# Patient Record
Sex: FEMALE | Race: WHITE | NOT HISPANIC OR LATINO | URBAN - METROPOLITAN AREA
[De-identification: names, ages, dates, MRNs, and addresses within clinical notes are randomized per-mention and may not be internally consistent; named-entity substitution may affect disease eponyms.]

---

## 2017-06-27 RX ORDER — INSULIN DETEMIR 100/ML (3)
0 INSULIN PEN (ML) SUBCUTANEOUS
Qty: 30 | Refills: 0 | COMMUNITY
Start: 2017-06-27

## 2017-07-09 RX ORDER — GLIPIZIDE/METFORMIN HCL 2.5-500 MG
0 TABLET ORAL
Qty: 60 | Refills: 0 | COMMUNITY
Start: 2017-07-09

## 2017-07-24 RX ORDER — OLMESARTAN MEDOXOMIL / AMLODIPINE BESYLATE / HYDROCHLOROTHIAZIDE 40; 10; 25 MG/1; MG/1; MG/1
0 TABLET, FILM COATED ORAL
Qty: 30 | Refills: 0 | COMMUNITY
Start: 2017-07-24

## 2017-08-01 ENCOUNTER — INPATIENT (INPATIENT)
Facility: HOSPITAL | Age: 82
LOS: 2 days | Discharge: HOME HEALTH | DRG: 602 | End: 2017-08-04
Attending: HOSPITALIST | Admitting: HOSPITALIST
Payer: MEDICARE

## 2017-08-01 VITALS
TEMPERATURE: 99 F | WEIGHT: 229.94 LBS | OXYGEN SATURATION: 96 % | HEART RATE: 67 BPM | RESPIRATION RATE: 18 BRPM | HEIGHT: 62.99 IN | DIASTOLIC BLOOD PRESSURE: 55 MMHG | SYSTOLIC BLOOD PRESSURE: 124 MMHG

## 2017-08-01 DIAGNOSIS — L03.90 CELLULITIS, UNSPECIFIED: ICD-10-CM

## 2017-08-01 LAB
ALBUMIN SERPL ELPH-MCNC: 2.9 G/DL — LOW (ref 3.5–5)
ALP SERPL-CCNC: 48 U/L — SIGNIFICANT CHANGE UP (ref 40–120)
ALT FLD-CCNC: 22 U/L DA — SIGNIFICANT CHANGE UP (ref 10–60)
ANION GAP SERPL CALC-SCNC: 3 MMOL/L — LOW (ref 5–17)
AST SERPL-CCNC: 24 U/L — SIGNIFICANT CHANGE UP (ref 10–40)
BASOPHILS # BLD AUTO: 0.1 K/UL — SIGNIFICANT CHANGE UP (ref 0–0.2)
BASOPHILS NFR BLD AUTO: 0.7 % — SIGNIFICANT CHANGE UP (ref 0–2)
BILIRUB SERPL-MCNC: 0.3 MG/DL — SIGNIFICANT CHANGE UP (ref 0.2–1.2)
BUN SERPL-MCNC: 42 MG/DL — HIGH (ref 7–18)
CALCIUM SERPL-MCNC: 8.5 MG/DL — SIGNIFICANT CHANGE UP (ref 8.4–10.5)
CHLORIDE SERPL-SCNC: 104 MMOL/L — SIGNIFICANT CHANGE UP (ref 96–108)
CO2 SERPL-SCNC: 30 MMOL/L — SIGNIFICANT CHANGE UP (ref 22–31)
CREAT SERPL-MCNC: 1.74 MG/DL — HIGH (ref 0.5–1.3)
EOSINOPHIL # BLD AUTO: 0.1 K/UL — SIGNIFICANT CHANGE UP (ref 0–0.5)
EOSINOPHIL NFR BLD AUTO: 1.2 % — SIGNIFICANT CHANGE UP (ref 0–6)
GLUCOSE SERPL-MCNC: 224 MG/DL — HIGH (ref 70–99)
HCT VFR BLD CALC: 31.5 % — LOW (ref 34.5–45)
HGB BLD-MCNC: 10.3 G/DL — LOW (ref 11.5–15.5)
LACTATE SERPL-SCNC: 1.4 MMOL/L — SIGNIFICANT CHANGE UP (ref 0.7–2)
LYMPHOCYTES # BLD AUTO: 1.9 K/UL — SIGNIFICANT CHANGE UP (ref 1–3.3)
LYMPHOCYTES # BLD AUTO: 25.1 % — SIGNIFICANT CHANGE UP (ref 13–44)
MCHC RBC-ENTMCNC: 32.7 GM/DL — SIGNIFICANT CHANGE UP (ref 32–36)
MCHC RBC-ENTMCNC: 33.3 PG — SIGNIFICANT CHANGE UP (ref 27–34)
MCV RBC AUTO: 101.8 FL — HIGH (ref 80–100)
MONOCYTES # BLD AUTO: 0.7 K/UL — SIGNIFICANT CHANGE UP (ref 0–0.9)
MONOCYTES NFR BLD AUTO: 9.5 % — SIGNIFICANT CHANGE UP (ref 2–14)
NEUTROPHILS # BLD AUTO: 4.9 K/UL — SIGNIFICANT CHANGE UP (ref 1.8–7.4)
NEUTROPHILS NFR BLD AUTO: 63.5 % — SIGNIFICANT CHANGE UP (ref 43–77)
PLATELET # BLD AUTO: 197 K/UL — SIGNIFICANT CHANGE UP (ref 150–400)
POTASSIUM SERPL-MCNC: 5.3 MMOL/L — SIGNIFICANT CHANGE UP (ref 3.5–5.3)
POTASSIUM SERPL-SCNC: 5.3 MMOL/L — SIGNIFICANT CHANGE UP (ref 3.5–5.3)
PROT SERPL-MCNC: 7.5 G/DL — SIGNIFICANT CHANGE UP (ref 6–8.3)
RBC # BLD: 3.1 M/UL — LOW (ref 3.8–5.2)
RBC # FLD: 12.2 % — SIGNIFICANT CHANGE UP (ref 10.3–14.5)
SODIUM SERPL-SCNC: 137 MMOL/L — SIGNIFICANT CHANGE UP (ref 135–145)
WBC # BLD: 7.7 K/UL — SIGNIFICANT CHANGE UP (ref 3.8–10.5)
WBC # FLD AUTO: 7.7 K/UL — SIGNIFICANT CHANGE UP (ref 3.8–10.5)

## 2017-08-01 PROCEDURE — 71010: CPT | Mod: 26

## 2017-08-01 PROCEDURE — 73590 X-RAY EXAM OF LOWER LEG: CPT | Mod: 26,RT

## 2017-08-01 PROCEDURE — 99285 EMERGENCY DEPT VISIT HI MDM: CPT

## 2017-08-01 RX ORDER — METOPROLOL TARTRATE 50 MG
50 TABLET ORAL
Qty: 0 | Refills: 0 | Status: DISCONTINUED | OUTPATIENT
Start: 2017-08-01 | End: 2017-08-04

## 2017-08-01 RX ORDER — DEXTROSE 50 % IN WATER 50 %
25 SYRINGE (ML) INTRAVENOUS ONCE
Qty: 0 | Refills: 0 | Status: DISCONTINUED | OUTPATIENT
Start: 2017-08-01 | End: 2017-08-02

## 2017-08-01 RX ORDER — SENNA PLUS 8.6 MG/1
2 TABLET ORAL AT BEDTIME
Qty: 0 | Refills: 0 | Status: DISCONTINUED | OUTPATIENT
Start: 2017-08-01 | End: 2017-08-04

## 2017-08-01 RX ORDER — CEFEPIME 1 G/1
1000 INJECTION, POWDER, FOR SOLUTION INTRAMUSCULAR; INTRAVENOUS ONCE
Qty: 0 | Refills: 0 | Status: COMPLETED | OUTPATIENT
Start: 2017-08-01 | End: 2017-08-01

## 2017-08-01 RX ORDER — HYDRALAZINE HCL 50 MG
50 TABLET ORAL EVERY 8 HOURS
Qty: 0 | Refills: 0 | Status: DISCONTINUED | OUTPATIENT
Start: 2017-08-01 | End: 2017-08-01

## 2017-08-01 RX ORDER — CLOPIDOGREL BISULFATE 75 MG/1
75 TABLET, FILM COATED ORAL DAILY
Qty: 0 | Refills: 0 | Status: DISCONTINUED | OUTPATIENT
Start: 2017-08-01 | End: 2017-08-04

## 2017-08-01 RX ORDER — GLUCAGON INJECTION, SOLUTION 0.5 MG/.1ML
1 INJECTION, SOLUTION SUBCUTANEOUS ONCE
Qty: 0 | Refills: 0 | Status: DISCONTINUED | OUTPATIENT
Start: 2017-08-01 | End: 2017-08-02

## 2017-08-01 RX ORDER — METOPROLOL TARTRATE 50 MG
25 TABLET ORAL
Qty: 0 | Refills: 0 | Status: DISCONTINUED | OUTPATIENT
Start: 2017-08-01 | End: 2017-08-01

## 2017-08-01 RX ORDER — RANOLAZINE 500 MG/1
1000 TABLET, FILM COATED, EXTENDED RELEASE ORAL
Qty: 0 | Refills: 0 | Status: DISCONTINUED | OUTPATIENT
Start: 2017-08-01 | End: 2017-08-04

## 2017-08-01 RX ORDER — FUROSEMIDE 40 MG
40 TABLET ORAL DAILY
Qty: 0 | Refills: 0 | Status: DISCONTINUED | OUTPATIENT
Start: 2017-08-01 | End: 2017-08-02

## 2017-08-01 RX ORDER — SODIUM CHLORIDE 9 MG/ML
1000 INJECTION INTRAMUSCULAR; INTRAVENOUS; SUBCUTANEOUS
Qty: 0 | Refills: 0 | Status: DISCONTINUED | OUTPATIENT
Start: 2017-08-01 | End: 2017-08-01

## 2017-08-01 RX ORDER — SODIUM CHLORIDE 9 MG/ML
1000 INJECTION INTRAMUSCULAR; INTRAVENOUS; SUBCUTANEOUS
Qty: 0 | Refills: 0 | Status: DISCONTINUED | OUTPATIENT
Start: 2017-08-01 | End: 2017-08-04

## 2017-08-01 RX ORDER — FERROUS SULFATE 325(65) MG
325 TABLET ORAL
Qty: 0 | Refills: 0 | Status: DISCONTINUED | OUTPATIENT
Start: 2017-08-01 | End: 2017-08-04

## 2017-08-01 RX ORDER — ATORVASTATIN CALCIUM 80 MG/1
20 TABLET, FILM COATED ORAL AT BEDTIME
Qty: 0 | Refills: 0 | Status: DISCONTINUED | OUTPATIENT
Start: 2017-08-01 | End: 2017-08-04

## 2017-08-01 RX ORDER — PANTOPRAZOLE SODIUM 20 MG/1
40 TABLET, DELAYED RELEASE ORAL
Qty: 0 | Refills: 0 | Status: DISCONTINUED | OUTPATIENT
Start: 2017-08-01 | End: 2017-08-04

## 2017-08-01 RX ORDER — SODIUM CHLORIDE 9 MG/ML
1000 INJECTION, SOLUTION INTRAVENOUS
Qty: 0 | Refills: 0 | Status: DISCONTINUED | OUTPATIENT
Start: 2017-08-01 | End: 2017-08-02

## 2017-08-01 RX ORDER — ENOXAPARIN SODIUM 100 MG/ML
40 INJECTION SUBCUTANEOUS DAILY
Qty: 0 | Refills: 0 | Status: DISCONTINUED | OUTPATIENT
Start: 2017-08-01 | End: 2017-08-02

## 2017-08-01 RX ORDER — INSULIN GLARGINE 100 [IU]/ML
25 INJECTION, SOLUTION SUBCUTANEOUS EVERY MORNING
Qty: 0 | Refills: 0 | Status: DISCONTINUED | OUTPATIENT
Start: 2017-08-01 | End: 2017-08-04

## 2017-08-01 RX ORDER — DEXTROSE 50 % IN WATER 50 %
1 SYRINGE (ML) INTRAVENOUS ONCE
Qty: 0 | Refills: 0 | Status: DISCONTINUED | OUTPATIENT
Start: 2017-08-01 | End: 2017-08-02

## 2017-08-01 RX ORDER — ZOLPIDEM TARTRATE 10 MG/1
5 TABLET ORAL AT BEDTIME
Qty: 0 | Refills: 0 | Status: DISCONTINUED | OUTPATIENT
Start: 2017-08-01 | End: 2017-08-04

## 2017-08-01 RX ORDER — ATORVASTATIN CALCIUM 80 MG/1
40 TABLET, FILM COATED ORAL AT BEDTIME
Qty: 0 | Refills: 0 | Status: DISCONTINUED | OUTPATIENT
Start: 2017-08-01 | End: 2017-08-01

## 2017-08-01 RX ORDER — TRAZODONE HCL 50 MG
100 TABLET ORAL DAILY
Qty: 0 | Refills: 0 | Status: DISCONTINUED | OUTPATIENT
Start: 2017-08-01 | End: 2017-08-04

## 2017-08-01 RX ORDER — DOCUSATE SODIUM 100 MG
100 CAPSULE ORAL
Qty: 0 | Refills: 0 | Status: DISCONTINUED | OUTPATIENT
Start: 2017-08-01 | End: 2017-08-04

## 2017-08-01 RX ORDER — POLYETHYLENE GLYCOL 3350 17 G/17G
17 POWDER, FOR SOLUTION ORAL ONCE
Qty: 0 | Refills: 0 | Status: COMPLETED | OUTPATIENT
Start: 2017-08-01 | End: 2017-08-01

## 2017-08-01 RX ORDER — INSULIN LISPRO 100/ML
VIAL (ML) SUBCUTANEOUS
Qty: 0 | Refills: 0 | Status: DISCONTINUED | OUTPATIENT
Start: 2017-08-01 | End: 2017-08-04

## 2017-08-01 RX ORDER — ACETAMINOPHEN 500 MG
650 TABLET ORAL EVERY 6 HOURS
Qty: 0 | Refills: 0 | Status: DISCONTINUED | OUTPATIENT
Start: 2017-08-01 | End: 2017-08-04

## 2017-08-01 RX ORDER — INSULIN GLARGINE 100 [IU]/ML
35 INJECTION, SOLUTION SUBCUTANEOUS AT BEDTIME
Qty: 0 | Refills: 0 | Status: DISCONTINUED | OUTPATIENT
Start: 2017-08-01 | End: 2017-08-04

## 2017-08-01 RX ORDER — DEXTROSE 50 % IN WATER 50 %
12.5 SYRINGE (ML) INTRAVENOUS ONCE
Qty: 0 | Refills: 0 | Status: DISCONTINUED | OUTPATIENT
Start: 2017-08-01 | End: 2017-08-02

## 2017-08-01 RX ORDER — FOLIC ACID 0.8 MG
1 TABLET ORAL DAILY
Qty: 0 | Refills: 0 | Status: DISCONTINUED | OUTPATIENT
Start: 2017-08-01 | End: 2017-08-04

## 2017-08-01 RX ADMIN — Medication 100 MILLIGRAM(S): at 23:58

## 2017-08-01 RX ADMIN — CEFEPIME 100 MILLIGRAM(S): 1 INJECTION, POWDER, FOR SOLUTION INTRAMUSCULAR; INTRAVENOUS at 12:49

## 2017-08-01 RX ADMIN — POLYETHYLENE GLYCOL 3350 17 GRAM(S): 17 POWDER, FOR SOLUTION ORAL at 23:47

## 2017-08-01 RX ADMIN — ZOLPIDEM TARTRATE 5 MILLIGRAM(S): 10 TABLET ORAL at 23:47

## 2017-08-01 NOTE — ED PROVIDER NOTE - PROGRESS NOTE DETAILS
Podiatry in house made aware carlos  pt awake alert oriented with use of  phone pt has discussed risk and benefits of AMA including worsening infection, gangrene, loss of limb and death.  she can verbalize and understanding of this and declines admission.  levaquin sent to pharmecy as next best treatment.  pt encouraged to return for any new or concerning symtptoms or wants to be admitted carlos pt now decided to stay

## 2017-08-01 NOTE — ED PROVIDER NOTE - PMH
Diabetes    Fall    Fx  right shoulder in 2009  Heart failure, chronic, diastolic    HLD (hyperlipidemia)    HTN (hypertension)    Osteoarthritis    Vertigo

## 2017-08-01 NOTE — ED ADULT NURSE NOTE - OBJECTIVE STATEMENT
Presented to ED complaining of right leg ulcer. AA&Ox3. Breathing on room air. With cast to right leg. Removed by MD.

## 2017-08-01 NOTE — ED PROVIDER NOTE - NS ED ROS FT
REVIEW OF SYSTEMS:  CONSTITUTIONAL: No fever, weight loss, or fatigue  EYES: No eye pain, visual disturbances, or discharge  ENMT:  No difficulty hearing, tinnitus, vertigo; No sinus or throat pain  NECK: No pain or stiffness  BREASTS: No pain, masses, or nipple discharge  RESPIRATORY: No cough, wheezing, chills or hemoptysis; No shortness of breath  CARDIOVASCULAR: No chest pain, palpitations, dizziness, bilateral leg swelling present   GASTROINTESTINAL: No abdominal or epigastric pain. No nausea, vomiting, or hematemesis; No diarrhea or constipation. No melena or hematochezia.  GENITOURINARY: No dysuria, frequency, hematuria, or incontinence  NEUROLOGICAL: No headaches, memory loss, loss of strength, numbness, or tremors  SKIN: LLE redness   LYMPH NODES: No enlarged glands  ENDOCRINE: No heat or cold intolerance; No hair loss

## 2017-08-01 NOTE — CONSULT NOTE ADULT - SUBJECTIVE AND OBJECTIVE BOX
S : 81y year old Female seen at bedside for Right foot ulceration and right leg cellulitis.  Patient relates to having the ulceration for ---, patient is being followed by Dr. Jensen, his Podiatrist. Patient was at Dr. Jensen's office, when the podiatrist was concerned for possible cellulites infection and recommended him to come to the ED for further evaluation.   Chief Complaint : Patient is a 81y old  Female who presents with a chief complaint of   HPI : HPI:      Patient admits to  (-) Fevers, (-) Chills, (-) Nausea, (-) Vomiting, (-) Shortness of Breath      PMH: Heart failure, chronic, diastolic  Osteoarthritis  Fx  Fall  HLD (hyperlipidemia)  Vertigo  HTN (hypertension)  Diabetes  Congestive heart failure    PSH:S/P angioplasty with stent  History of cholecystectomy  No significant past surgical history      Allergies:No Known Allergies      Labs:      WBC Trend      Chem              T(F): 99.2 (08-01-17 @ 10:45), Max: 99.2 (08-01-17 @ 10:45)  HR: 67 (08-01-17 @ 10:45) (67 - 67)  BP: 124/55 (08-01-17 @ 10:45) (124/55 - 124/55)  RR: 18 (08-01-17 @ 10:45) (18 - 18)  SpO2: 96% (08-01-17 @ 10:45) (96% - 96%)  Wt(kg): --    O:   General: Pleasant  female NAD & AOX3.    Integument:  Skin warm, dry and supple bilateral.    Ulceration Right leg red streaking extending from forefoot to leg, erythematous, warm to touch   Right foot ulceration:- in nature, +/- hyperkeratotic border, wound base Granular/Fibrogranular/Necrotic patches/ , wound size (-- cm X – cm X –cm) +/- edema, +/- yazmin-wound erythema, +/- purulence, +/- fluctuance, +/- tracking/tunneling, +/- probe to bone.   Vascular: Dorsalis Pedis and Posterior Tibial pulses --/4.  Capillary re-fill time less then 3 seconds digits 1-5 bilateral.    Neuro: Protective sensation intact/diminished to the level of the digits bilateral.  MSK: Muscle strength 5/5 all major muscle groups bilateral.  Deformity:  A: Right/Left foot ulceration      P:   Chart reviewed and Patient evaluated  Discussed diagnosis and treatment with patient  Wound flush with normal saline  Obtained wound culture to be sent to Pathology  Applied right foot ulcer with dry sterile dressing  X-rays reviewed : No soft tissue emphysema present   Continue with IV antibiotics As Per ID  Ordered CADEN, vascular consult recommended   NWB to right leg  Physical Therapy consult recommended.   Discussed importance of daily foot examinations and proper shoe gear and to importance of lower Fasting Blood Glucose levels.   Podiatry will follow while in house.  Discussed with Dr. Montelongo S : 81y year old Female seen at bedside for Right leg ulceration and right leg cellulitis.  Patient relates to having the ulceration for 1 month, patient is being followed by Dr. Jensen, her Podiatrist. Patient was at Dr. Jensen's office, when the podiatrist was concerned for possible cellulites infection and recommended her to come to the ED for further evaluation. Mikey Palmer: [phone]. Spoke to Dr. Jensen over the phone, he has been seeing the patient for 1 week now, he applied Unna Boot to the patients leg to decrease the swelling, also ordered cultures of the ulcer site which came back positive for Pseudomonas infection and wants patients to be treated via IV antibiotics. Patient is accompanied by home aid and speaks only Egyptian.      Patient admits to  (+) Fevers, (-) Chills, (-) Nausea, (-) Vomiting, (-) Shortness of Breath      PMH: Heart failure, chronic, diastolic  Osteoarthritis  Fx  Fall  HLD (hyperlipidemia)  Vertigo  HTN (hypertension)  Diabetes  Congestive heart failure    PSH:S/P angioplasty with stent  History of cholecystectomy  No significant past surgical history      Allergies:No Known Allergies      Labs:      WBC Trend      Chem              T(F): 99.2 (08-01-17 @ 10:45), Max: 99.2 (08-01-17 @ 10:45)  HR: 67 (08-01-17 @ 10:45) (67 - 67)  BP: 124/55 (08-01-17 @ 10:45) (124/55 - 124/55)  RR: 18 (08-01-17 @ 10:45) (18 - 18)  SpO2: 96% (08-01-17 @ 10:45) (96% - 96%)  Wt(kg): --    O:   General: Pleasant  female NAD & AOX3.    Integument:  Skin warm, dry and supple bilateral.    Ulceration Right leg red streaking extending from forefoot to leg, erythematous, warm to touch   Right lateral leg ulceration:-  fibrotic in nature with green patches, - hyperkeratotic border, +  edema, +  yazmin-wound erythema, - purulence, -  fluctuance, - tracking/tunneling, - probe to bone.   Vascular: Dorsalis Pedis 1/4 and Posterior Tibial pulses non palpable.  Capillary re-fill time less then 3 seconds digits 1-5 bilateral.    Neuro: Protective sensation diminished to the level of the digits bilateral.  MSK: Muscle strength 3/5 all major muscle groups bilateral. Pain upon palpation of right leg   Deformity:  A: Right leg ulcer and cellultis     P:   Chart reviewed and Patient evaluated  Discussed diagnosis and treatment with patient  Wound flush with normal saline  Obtained wound culture to be sent to Pathology  Applied right foot ulcer with dry sterile dressing  X-rays reviewed : No soft tissue emphysema present   Continue with IV antibiotics As Per ID  Ordered CADEN, vascular consult recommended   NWB to right leg  Physical Therapy consult recommended.   Discussed importance of daily foot examinations and proper shoe gear and to importance of lower Fasting Blood Glucose levels.   Podiatry will follow while in house.  Discussed with Dr. Montelongo

## 2017-08-01 NOTE — ED PROVIDER NOTE - PHYSICAL EXAMINATION
GENERAL: NAD, well-groomed, well-developed  HEAD:  Atraumatic, Normocephalic  EYES: EOMI, PERRLA, conjunctiva and sclera clear  ENMT: No tonsillar erythema, exudates, or enlargement; Moist mucous membranes, Good dentition, No lesions  NECK: Supple, No JVD, Normal thyroid  NERVOUS SYSTEM:  Alert & Oriented X3,   CHEST/LUNG: Clear to percussion bilaterally; No rales, rhonchi, wheezing, or rubs  HEART: Regular rate and rhythm; No murmurs, rubs, or gallops  ABDOMEN: Soft, Nontender, Nondistended; Bowel sounds present  EXTREMITIES:  right leg in bandage. No clubbing, cyanosis, or edema  LYMPH: No lymphadenopathy noted

## 2017-08-01 NOTE — ED PROVIDER NOTE - OBJECTIVE STATEMENT
81 year old female from home 81 year old female from home past medical history of diabetes, diabetic neuropathy, hypertension, hyperlipidemia, depression, heart failure, constipation and recently diagnosed with klebsiella and pseudomonas bacteremia was sent by podiatry Dr Jensen for evaluation of worsening of right leg redness and swelling. As per patient, the podiatrist is concerned she might have cellulitis and requires iv antibiotics. Denies recent cough, fever, shortness of breath, abdominal pain, diarrhea, dysuria or other complaints at this time. 81 year old female from home past medical history of diabetes, diabetic neuropathy, hypertension, hyperlipidemia, depression, heart failure, constipation and recently diagnosed with klebsiella and pseudomonas bacteremia was sent by podiatry Dr Jensen for evaluation of worsening of right leg redness and swelling. As per patient, the podiatrist is concerned she might have cellulitis and requires iv antibiotics. Denies recent cough, fever, shortness of breath, abdominal pain, diarrhea, dysuria or other complaints at this time.  carlos 81 year old with complaitn of being setn to ed for iv antibx

## 2017-08-02 DIAGNOSIS — I50.9 HEART FAILURE, UNSPECIFIED: ICD-10-CM

## 2017-08-02 DIAGNOSIS — L03.90 CELLULITIS, UNSPECIFIED: ICD-10-CM

## 2017-08-02 DIAGNOSIS — F32.9 MAJOR DEPRESSIVE DISORDER, SINGLE EPISODE, UNSPECIFIED: ICD-10-CM

## 2017-08-02 DIAGNOSIS — I10 ESSENTIAL (PRIMARY) HYPERTENSION: ICD-10-CM

## 2017-08-02 DIAGNOSIS — Z29.9 ENCOUNTER FOR PROPHYLACTIC MEASURES, UNSPECIFIED: ICD-10-CM

## 2017-08-02 DIAGNOSIS — N17.9 ACUTE KIDNEY FAILURE, UNSPECIFIED: ICD-10-CM

## 2017-08-02 DIAGNOSIS — E11.9 TYPE 2 DIABETES MELLITUS WITHOUT COMPLICATIONS: ICD-10-CM

## 2017-08-02 LAB
24R-OH-CALCIDIOL SERPL-MCNC: 32.7 NG/ML — SIGNIFICANT CHANGE UP (ref 30–100)
ALBUMIN SERPL ELPH-MCNC: 2.9 G/DL — LOW (ref 3.5–5)
ALP SERPL-CCNC: 52 U/L — SIGNIFICANT CHANGE UP (ref 40–120)
ALT FLD-CCNC: 23 U/L DA — SIGNIFICANT CHANGE UP (ref 10–60)
ANION GAP SERPL CALC-SCNC: 3 MMOL/L — LOW (ref 5–17)
APPEARANCE UR: CLEAR — SIGNIFICANT CHANGE UP
AST SERPL-CCNC: 22 U/L — SIGNIFICANT CHANGE UP (ref 10–40)
BASOPHILS # BLD AUTO: 0.1 K/UL — SIGNIFICANT CHANGE UP (ref 0–0.2)
BASOPHILS NFR BLD AUTO: 0.7 % — SIGNIFICANT CHANGE UP (ref 0–2)
BILIRUB SERPL-MCNC: 0.3 MG/DL — SIGNIFICANT CHANGE UP (ref 0.2–1.2)
BILIRUB UR-MCNC: NEGATIVE — SIGNIFICANT CHANGE UP
BUN SERPL-MCNC: 34 MG/DL — HIGH (ref 7–18)
CALCIUM SERPL-MCNC: 8.9 MG/DL — SIGNIFICANT CHANGE UP (ref 8.4–10.5)
CHLORIDE SERPL-SCNC: 105 MMOL/L — SIGNIFICANT CHANGE UP (ref 96–108)
CHOLEST SERPL-MCNC: 151 MG/DL — SIGNIFICANT CHANGE UP (ref 10–199)
CO2 SERPL-SCNC: 31 MMOL/L — SIGNIFICANT CHANGE UP (ref 22–31)
COLOR SPEC: YELLOW — SIGNIFICANT CHANGE UP
CREAT ?TM UR-MCNC: 22 MG/DL — SIGNIFICANT CHANGE UP
CREAT SERPL-MCNC: 1.48 MG/DL — HIGH (ref 0.5–1.3)
DIFF PNL FLD: NEGATIVE — SIGNIFICANT CHANGE UP
EOSINOPHIL # BLD AUTO: 0.1 K/UL — SIGNIFICANT CHANGE UP (ref 0–0.5)
EOSINOPHIL NFR BLD AUTO: 1.1 % — SIGNIFICANT CHANGE UP (ref 0–6)
ERYTHROCYTE [SEDIMENTATION RATE] IN BLOOD: 61 MM/HR — HIGH (ref 0–20)
FOLATE SERPL-MCNC: 11.6 NG/ML — SIGNIFICANT CHANGE UP (ref 4.8–24.2)
GLUCOSE SERPL-MCNC: 141 MG/DL — HIGH (ref 70–99)
GLUCOSE UR QL: 50 MG/DL
HBA1C BLD-MCNC: 6.2 % — HIGH (ref 4–5.6)
HCT VFR BLD CALC: 33.9 % — LOW (ref 34.5–45)
HDLC SERPL-MCNC: 41 MG/DL — SIGNIFICANT CHANGE UP (ref 40–125)
HGB BLD-MCNC: 10.9 G/DL — LOW (ref 11.5–15.5)
KETONES UR-MCNC: NEGATIVE — SIGNIFICANT CHANGE UP
LEUKOCYTE ESTERASE UR-ACNC: ABNORMAL
LIPID PNL WITH DIRECT LDL SERPL: 61 MG/DL — SIGNIFICANT CHANGE UP
LYMPHOCYTES # BLD AUTO: 1.9 K/UL — SIGNIFICANT CHANGE UP (ref 1–3.3)
LYMPHOCYTES # BLD AUTO: 22.9 % — SIGNIFICANT CHANGE UP (ref 13–44)
MAGNESIUM SERPL-MCNC: 2 MG/DL — SIGNIFICANT CHANGE UP (ref 1.6–2.6)
MCHC RBC-ENTMCNC: 32.1 GM/DL — SIGNIFICANT CHANGE UP (ref 32–36)
MCHC RBC-ENTMCNC: 33.4 PG — SIGNIFICANT CHANGE UP (ref 27–34)
MCV RBC AUTO: 103.8 FL — HIGH (ref 80–100)
MONOCYTES # BLD AUTO: 0.8 K/UL — SIGNIFICANT CHANGE UP (ref 0–0.9)
MONOCYTES NFR BLD AUTO: 9.1 % — SIGNIFICANT CHANGE UP (ref 2–14)
NEUTROPHILS # BLD AUTO: 5.5 K/UL — SIGNIFICANT CHANGE UP (ref 1.8–7.4)
NEUTROPHILS NFR BLD AUTO: 66.1 % — SIGNIFICANT CHANGE UP (ref 43–77)
NITRITE UR-MCNC: NEGATIVE — SIGNIFICANT CHANGE UP
PH UR: 5 — SIGNIFICANT CHANGE UP (ref 5–8)
PHOSPHATE SERPL-MCNC: 2.5 MG/DL — SIGNIFICANT CHANGE UP (ref 2.5–4.5)
PLATELET # BLD AUTO: 187 K/UL — SIGNIFICANT CHANGE UP (ref 150–400)
POTASSIUM SERPL-MCNC: 5.2 MMOL/L — SIGNIFICANT CHANGE UP (ref 3.5–5.3)
POTASSIUM SERPL-SCNC: 5.2 MMOL/L — SIGNIFICANT CHANGE UP (ref 3.5–5.3)
PROT SERPL-MCNC: 7.7 G/DL — SIGNIFICANT CHANGE UP (ref 6–8.3)
PROT UR-MCNC: 15
RBC # BLD: 3.27 M/UL — LOW (ref 3.8–5.2)
RBC # FLD: 12.5 % — SIGNIFICANT CHANGE UP (ref 10.3–14.5)
SODIUM SERPL-SCNC: 139 MMOL/L — SIGNIFICANT CHANGE UP (ref 135–145)
SODIUM UR-SCNC: 119 MMOL/L — SIGNIFICANT CHANGE UP (ref 40–220)
SP GR SPEC: 1.01 — SIGNIFICANT CHANGE UP (ref 1.01–1.02)
TOTAL CHOLESTEROL/HDL RATIO MEASUREMENT: 3.7 RATIO — SIGNIFICANT CHANGE UP (ref 3.3–7.1)
TRIGL SERPL-MCNC: 246 MG/DL — HIGH (ref 10–149)
TSH SERPL-MCNC: 5.16 UU/ML — HIGH (ref 0.34–4.82)
UROBILINOGEN FLD QL: NEGATIVE — SIGNIFICANT CHANGE UP
VIT B12 SERPL-MCNC: 586 PG/ML — SIGNIFICANT CHANGE UP (ref 243–894)
WBC # BLD: 8.3 K/UL — SIGNIFICANT CHANGE UP (ref 3.8–10.5)
WBC # FLD AUTO: 8.3 K/UL — SIGNIFICANT CHANGE UP (ref 3.8–10.5)

## 2017-08-02 PROCEDURE — 99223 1ST HOSP IP/OBS HIGH 75: CPT | Mod: AI,GC

## 2017-08-02 PROCEDURE — 93970 EXTREMITY STUDY: CPT | Mod: 26

## 2017-08-02 RX ORDER — HEPARIN SODIUM 5000 [USP'U]/ML
5000 INJECTION INTRAVENOUS; SUBCUTANEOUS EVERY 8 HOURS
Qty: 0 | Refills: 0 | Status: DISCONTINUED | OUTPATIENT
Start: 2017-08-02 | End: 2017-08-04

## 2017-08-02 RX ORDER — CEFEPIME 1 G/1
2000 INJECTION, POWDER, FOR SOLUTION INTRAMUSCULAR; INTRAVENOUS EVERY 12 HOURS
Qty: 0 | Refills: 0 | Status: DISCONTINUED | OUTPATIENT
Start: 2017-08-02 | End: 2017-08-02

## 2017-08-02 RX ORDER — CEFEPIME 1 G/1
1000 INJECTION, POWDER, FOR SOLUTION INTRAMUSCULAR; INTRAVENOUS EVERY 12 HOURS
Qty: 0 | Refills: 0 | Status: DISCONTINUED | OUTPATIENT
Start: 2017-08-02 | End: 2017-08-04

## 2017-08-02 RX ORDER — FUROSEMIDE 40 MG
40 TABLET ORAL DAILY
Qty: 0 | Refills: 0 | Status: DISCONTINUED | OUTPATIENT
Start: 2017-08-02 | End: 2017-08-04

## 2017-08-02 RX ORDER — VANCOMYCIN HCL 1 G
750 VIAL (EA) INTRAVENOUS ONCE
Qty: 0 | Refills: 0 | Status: COMPLETED | OUTPATIENT
Start: 2017-08-02 | End: 2017-08-02

## 2017-08-02 RX ORDER — VANCOMYCIN HCL 1 G
750 VIAL (EA) INTRAVENOUS EVERY 12 HOURS
Qty: 0 | Refills: 0 | Status: DISCONTINUED | OUTPATIENT
Start: 2017-08-03 | End: 2017-08-04

## 2017-08-02 RX ORDER — VANCOMYCIN HCL 1 G
VIAL (EA) INTRAVENOUS
Qty: 0 | Refills: 0 | Status: DISCONTINUED | OUTPATIENT
Start: 2017-08-02 | End: 2017-08-04

## 2017-08-02 RX ORDER — AMLODIPINE BESYLATE 2.5 MG/1
5 TABLET ORAL DAILY
Qty: 0 | Refills: 0 | Status: DISCONTINUED | OUTPATIENT
Start: 2017-08-02 | End: 2017-08-04

## 2017-08-02 RX ADMIN — Medication 325 MILLIGRAM(S): at 08:31

## 2017-08-02 RX ADMIN — Medication 650 MILLIGRAM(S): at 07:53

## 2017-08-02 RX ADMIN — RANOLAZINE 1000 MILLIGRAM(S): 500 TABLET, FILM COATED, EXTENDED RELEASE ORAL at 17:00

## 2017-08-02 RX ADMIN — Medication 150 MILLIGRAM(S): at 16:52

## 2017-08-02 RX ADMIN — Medication 100 MILLIGRAM(S): at 06:04

## 2017-08-02 RX ADMIN — CLOPIDOGREL BISULFATE 75 MILLIGRAM(S): 75 TABLET, FILM COATED ORAL at 12:23

## 2017-08-02 RX ADMIN — Medication 325 MILLIGRAM(S): at 17:00

## 2017-08-02 RX ADMIN — RANOLAZINE 1000 MILLIGRAM(S): 500 TABLET, FILM COATED, EXTENDED RELEASE ORAL at 06:04

## 2017-08-02 RX ADMIN — INSULIN GLARGINE 25 UNIT(S): 100 INJECTION, SOLUTION SUBCUTANEOUS at 11:30

## 2017-08-02 RX ADMIN — Medication 100 MILLIGRAM(S): at 17:00

## 2017-08-02 RX ADMIN — ATORVASTATIN CALCIUM 20 MILLIGRAM(S): 80 TABLET, FILM COATED ORAL at 21:42

## 2017-08-02 RX ADMIN — INSULIN GLARGINE 35 UNIT(S): 100 INJECTION, SOLUTION SUBCUTANEOUS at 21:42

## 2017-08-02 RX ADMIN — Medication 650 MILLIGRAM(S): at 17:04

## 2017-08-02 RX ADMIN — Medication 650 MILLIGRAM(S): at 06:05

## 2017-08-02 RX ADMIN — Medication 2: at 12:24

## 2017-08-02 RX ADMIN — Medication 325 MILLIGRAM(S): at 11:36

## 2017-08-02 RX ADMIN — Medication 1 MILLIGRAM(S): at 11:36

## 2017-08-02 RX ADMIN — PANTOPRAZOLE SODIUM 40 MILLIGRAM(S): 20 TABLET, DELAYED RELEASE ORAL at 06:04

## 2017-08-02 RX ADMIN — Medication 1: at 08:31

## 2017-08-02 RX ADMIN — Medication 40 MILLIGRAM(S): at 06:04

## 2017-08-02 RX ADMIN — CEFEPIME 100 MILLIGRAM(S): 1 INJECTION, POWDER, FOR SOLUTION INTRAMUSCULAR; INTRAVENOUS at 17:01

## 2017-08-02 RX ADMIN — Medication 100 MILLIGRAM(S): at 21:42

## 2017-08-02 RX ADMIN — Medication 50 MILLIGRAM(S): at 06:04

## 2017-08-02 RX ADMIN — Medication 1: at 17:01

## 2017-08-02 RX ADMIN — ZOLPIDEM TARTRATE 5 MILLIGRAM(S): 10 TABLET ORAL at 21:42

## 2017-08-02 RX ADMIN — AMLODIPINE BESYLATE 5 MILLIGRAM(S): 2.5 TABLET ORAL at 12:32

## 2017-08-02 RX ADMIN — Medication 650 MILLIGRAM(S): at 18:11

## 2017-08-02 RX ADMIN — Medication 50 MILLIGRAM(S): at 17:03

## 2017-08-02 NOTE — H&P ADULT - PROBLEM SELECTOR PLAN 1
Right leg cellulitis, warmth, erythema. 2 + pitting edema, no discharge , abscess  Afebrile, no wbc count, X-ray tibia/ fibula negative for any fracture, gas , lactate normal   Got NS bolus and cefepime   Continue with cefepime 2 gm q12 as history of klebsiella and pseudomonas bacteremia ( as seen lab work at podiatrist office)   ID Dr Heard   Since bilateral 2 + pitting edema will do the doppler to rule out DVT  Management as per Podiatry   As per patient she has MRI leg  scheduled to-radha at [facility] Right leg cellulitis, warmth, erythema. 2 + pitting edema, no discharge , abscess  Afebrile, no wbc count, X-ray tibia/ fibula negative for any fracture, gas , lactate normal   Got NS bolus and cefepime   Continue with cefepime 1 gm q12 )   ID Dr Heard   Since bilateral 2 + pitting edema will do the doppler to rule out DVT  Management as per Podiatry   As per patient she has MRI leg  scheduled to-radha at [facility] Right leg cellulitis with open ulcers on the distal third of right leg; increased warmth and erythema with 2 + pitting edema, no discharge and no obvious signs of abscess collection  Afebrile, no wbc count, X-ray tibia/ fibula negative for any fracture, gas , lactate normal   Got NS bolus and cefepime   Continue with cefepime 1 gm q12 )   ID Dr Heard   Since bilateral 2 + pitting edema will do the doppler to rule out DVT  Management as per Podiatry   As per patient she has MRI leg  scheduled to-radha at [facility]

## 2017-08-02 NOTE — CONSULT NOTE ADULT - ASSESSMENT
1.	Right leg cellulitis 2/2 infected leg ulcer  ·	continue maxipime 1gm IV q12h D2  ·	awaiting culture results 1.	Right leg cellulitis 2/2 infected leg ulcer  ·	continue maxipime 1gm IV q12h D2  ·	start vanco 750mg IV q12h  ·	awaiting culture results

## 2017-08-02 NOTE — H&P ADULT - PROBLEM SELECTOR PLAN 7
Improve VE score is 1 with 3 month risk of VTE is 0.6, will give Lovenox as DVT prophylaxis   Protonix for GI ppx

## 2017-08-02 NOTE — CONSULT NOTE ADULT - SUBJECTIVE AND OBJECTIVE BOX
HPI:  81 year old female from home (HHA 10 hrs/ day) with pmhx of diabetes, diabetic neuropathy, hypertension, hyperlipidemia, depression, heart failure, constipation and recently diagnosed with klebsiella /pseudomonas bacteremia was sent by podiatry Dr Jensen yesterday for evaluation of worsening right leg redness and swelling. As per patient, the podiatrist was concerned she might have cellulitis and may need iv antibiotics.  As per the patient this has been going on for 6 weeks. She has bilateral leg swelling, also noticed blisters that sometimes turn into wounds on leg. Patient just returned from vascular from having venous doppler.  Was neg for DVT of BLE.  Still complains of R/L leg pain and denies any improvement from yesterday.    REVIEW OF SYSTEMS:  [  ] Not able to illicit  General: no fevers no malaise no chills	  Chest: no cough no SOB no chest wall tenderness  GI: no nvd no abdominal pain  : no urinary symptoms   Skin: right leg warmth and swelling  Musculoskeletal: R>L leg apin	  Neuro: no na's no dizziness    PAST MEDICAL & SURGICAL HISTORY:  Heart failure, chronic, diastolic  Osteoarthritis  Fx: right shoulder in 2009  Fall  HLD (hyperlipidemia)  Vertigo  HTN (hypertension)  Diabetes  S/P angioplasty with stent: [id number]  History of cholecystectomy: in 1988    ALLERGIES: No Known Allergies    MEDS:  insulin lispro (HumaLOG) corrective regimen sliding scale   SubCutaneous three times a day before meals  ferrous    sulfate 325 milliGRAM(s) Oral three times a day with meals  pantoprazole    Tablet 40 milliGRAM(s) Oral before breakfast  folic acid 1 milliGRAM(s) Oral daily  clopidogrel Tablet 75 milliGRAM(s) Oral daily  metoprolol 50 milliGRAM(s) Oral two times a day  ranolazine 1000 milliGRAM(s) Oral two times a day  atorvastatin 20 milliGRAM(s) Oral at bedtime  traZODone 100 milliGRAM(s) Oral daily  zolpidem 5 milliGRAM(s) Oral at bedtime PRN  senna 2 Tablet(s) Oral at bedtime  docusate sodium 100 milliGRAM(s) Oral two times a day  insulin glargine Injectable (LANTUS) 25 Unit(s) SubCutaneous every morning  insulin glargine Injectable (LANTUS) 35 Unit(s) SubCutaneous at bedtime  acetaminophen   Tablet. 650 milliGRAM(s) Oral every 6 hours PRN  furosemide    Tablet 40 milliGRAM(s) Oral daily  sodium chloride 0.9%. 1000 milliLiter(s) IV Continuous <Continuous>  heparin  Injectable 5000 Unit(s) SubCutaneous every 8 hours  cefepime  IVPB 1000 milliGRAM(s) IV Intermittent every 12 hours (8/01)  amLODIPine   Tablet 5 milliGRAM(s) Oral daily    SOCIAL HISTORY:  Smoker:  nonsmoker    FAMILY HISTORY: noncontributory    VITALS:  Vital Signs Last 24 Hrs  T(C): 36.8 (02 Aug 2017 05:50), Max: 36.8 (01 Aug 2017 15:25)  T(F): 98.3 (02 Aug 2017 05:50), Max: 98.3 (01 Aug 2017 15:25)  HR: 74 (02 Aug 2017 05:50) (61 - 74)  BP: 149/72 (02 Aug 2017 05:50) (125/51 - 149/72)  BP(mean): --  RR: 17 (02 Aug 2017 05:50) (16 - 18)  SpO2: 95% (02 Aug 2017 05:50) (95% - 99%)    PHYSICAL EXAM:  HEENT: normocephalic with moist oral mucosa  Neck: supple short neck no LN's No JVD  Respiratory: lungs clear no rales no wheezing  Cardiovascular: S1 S2 reg no murmurs  Gastrointestinal: soft, large abdominal pannus with +BS; nontender and nondistended   Extremities: BLE 2+ nonpitting edema  Skin: RLE mild erythema and faint warmth; 2x2cm ulcer of lateral aspect of right lower leg with fibrotic base and mild purulent drainage. +pain with palpation  Ortho: no jt swelling  Neuro: AAO x 3    LABS/DIAGNOSTIC TESTS:                        10.9   8.3   )-----------( 187      ( 02 Aug 2017 06:50 )             33.9     WBC Count: 8.3 K/uL (08-02 @ 06:50)  WBC Count: 7.7 K/uL (08-01 @ 12:58)    08-02    139  |  105  |  34<H>  ----------------------------<  141<H>  5.2   |  31  |  1.48<H> 1.74    Ca    8.9      02 Aug 2017 06:50  Phos  2.5     08-02  Mg     2.0     08-02    TPro  7.7  /  Alb  2.9<L>  /  TBili  0.3  /  DBili  x   /  AST  22  /  ALT  23  /  AlkPhos  52  08-02      LIVER FUNCTIONS - ( 02 Aug 2017 06:50 )  Alb: 2.9 g/dL / Pro: 7.7 g/dL / ALK PHOS: 52 U/L / ALT: 23 U/L DA / AST: 22 U/L / GGT: x             Lactate, Blood: 1.4 mmol/L (08-01 @ 12:58)    Sedimentation Rate, Erythrocyte (08.02.17 @ 06:50)    Sedimentation Rate, Erythrocyte: 61 mm/Hr    Hemoglobin A1C, Whole Blood (08.02.17 @ 09:11)    Hemoglobin A1C, Whole Blood: 6.2    CULTURES:     8/01 BC - pending  8/01 right leg cx- pending      RADIOLOGY:  EXAM:  LEG AP&LAT - RIGHT                        PROCEDURE DATE:  08/01/2017    INTERPRETATION:  AP AND LATERAL VIEWS OF THE RIGHT TIBIA AND FIBULA     HISTORY: ulcer.    COMPARISON: None available.    FINDINGS:  Overlying stocking obscures finer bony detail. No acute fracture or   dislocation. Partially visualized intramedullary nail in the distal   femur. Extensive atherosclerotic calcifications. No subcutaneous gas.    IMPRESSION:  No acute osseous abnormality. No subcutaneous gas.        EXAM:  CHEST SINGLE AP OR PA                        PROCEDURE DATE:  08/01/2017    INTERPRETATION:  PORTABLE CHEST X-RAY    HISTORY: leg ulcer. . Admission chest x-ray.    COMPARISON: 10/10/2014.    FINDINGS:  No focal lung consolidation.  No pneumothorax or pleural effusion.   The cardiac silhouette is not accurately assessed by AP technique.  Degenerative changes of the right shoulder are noted.    IMPRESSION:  No focal lung consolidation.

## 2017-08-02 NOTE — H&P ADULT - NSHPPHYSICALEXAM_GEN_ALL_CORE
GENERAL: NAD, Obese   HEAD:  , Normocephalic  EYES:  conjunctiva and sclera clear  NECK: Supple, No JVD    NERVOUS SYSTEM:  Alert & Oriented X3,   CHEST/LUNG: Clear to auscultation bilaterally;   HEART: S1 S2+;   ABDOMEN: Soft, Nontender, Nondistended; Bowel sounds present  EXTREMITIES:  2 + pitting  edema; erythema, warm,  no calf tenderness, bilateral stasis dermatitis GENERAL: NAD, Obese   HEAD:  , Normocephalic  EYES:  conjunctiva and sclera clear  NECK: Supple, No JVD    NERVOUS SYSTEM:  Alert & Oriented X3,   CHEST/LUNG: Clear to auscultation bilaterally;   HEART: S1 S2+;   ABDOMEN: Soft, Nontender, Nondistended; Bowel sounds present  EXTREMITIES:  2 + pitting  edema; erythema, warm,  no calf tenderness, bilateral stasis dermatitis  PSYCHIATRY: normal affect and behavior  VASCULAR: distal peripheral Pulses+ bilaterally  SKIN: right lateral distal third multiple ulcerations possibly vascular ulcers with yellowish discharge

## 2017-08-02 NOTE — H&P ADULT - HISTORY OF PRESENT ILLNESS
81 year old female, uses wheel chair, HHA 10 hrs/ day,  from home past medical history of diabetes, diabetic neuropathy, hypertension, hyperlipidemia, depression, heart failure, constipation and recently diagnosed with klebsiella and pseudomonas bacteremia was sent by podiatry Dr Jensen for evaluation of worsening of right leg redness and swelling. As per patient, the podiatrist is concerned she might have cellulitis and requires iv antibiotics. Denies recent cough, fever, shortness of breath, abdominal pain, diarrhea, dysuria or other complaints at this time. As per the patient this has been going on for 6 weeks. She has bilateral leg swelling, also noticed blisters that sometimes turn into wounds on leg. She has her appointment for MRI at [facility] She has colonoscopy and EGD about 2 yrs ago with normal results.

## 2017-08-02 NOTE — H&P ADULT - ASSESSMENT
In the ED vitals stable, hb is 10.3, Cr of 1.74, lactate normal, CXR clear, Xray tibia, fibula negative for any gas or fracture, blood and surgical site culture sent. Got NS bolus, cefeipime 1 gm. Seen by podiatry , dressing applied. EKG sinus arrythmia, irregular , T wave flattening  in lead V1, V2 , Avl,  Admitted to medicine floor for cellulitis .

## 2017-08-02 NOTE — H&P ADULT - NSHPREVIEWOFSYSTEMS_GEN_ALL_CORE
REVIEW OF SYSTEMS:    CONSTITUTIONAL: No fever,  RESPIRATORY: No cough; No shortness of breath  CARDIOVASCULAR: No chest pain, no palpitations  GASTROINTESTINAL: No pain. No nausea or vomiting; No diarrhea   EXTREMITIES: Bilateral leg pain , swelling, ulcer

## 2017-08-02 NOTE — H&P ADULT - NSHPLABSRESULTS_GEN_ALL_CORE
LABS:                        10.9   8.3   )-----------( 187      ( 02 Aug 2017 06:50 )             33.9     08-02    139  |  105  |  34<H>  ----------------------------<  141<H>  5.2   |  31  |  1.48<H>    Ca    8.9      02 Aug 2017 06:50  Phos  2.5     08-02  Mg     2.0     08-02    TPro  7.7  /  Alb  2.9<L>  /  TBili  0.3  /  DBili  x   /  AST  22  /  ALT  23  /  AlkPhos  52  08-02 LABS:                        10.9   8.3   )-----------( 187      ( 02 Aug 2017 06:50 )             33.9     08-02    139  |  105  |  34<H>  ----------------------------<  141<H>  5.2   |  31  |  1.48<H>    Ca    8.9      02 Aug 2017 06:50  Phos  2.5     08-02  Mg     2.0     08-02    TPro  7.7  /  Alb  2.9<L>  /  TBili  0.3  /  DBili  x   /  AST  22  /  ALT  23  /  Alk Phos  52  08-02 LABS:                        10.9   8.3   )-----------( 187      ( 02 Aug 2017 06:50 )             33.9     08-02    139  |  105  |  34<H>  ----------------------------<  141<H>  5.2   |  31  |  1.48<H>    Ca    8.9      02 Aug 2017 06:50  Phos  2.5     08-02  Mg     2.0     08-02    TPro  7.7  /  Alb  2.9<L>  /  TBili  0.3  /  DBili  x   /  AST  22  /  ALT  23  /  Alk Phos  52  08-02    CXR reviewed- no infiltrate and no effusion    LE dopplers- no evidence of DVT of bilateral legs

## 2017-08-02 NOTE — H&P ADULT - PROBLEM SELECTOR PLAN 3
Continue with home med metoprolol 50 BID  BP stable in ED   Also patient taking Tribenzor 40/25 ( olmesartan combination) , primary team to please follow it, as BP stable ton hold of it for now

## 2017-08-02 NOTE — H&P ADULT - ATTENDING COMMENTS
Patient was seen and examined by myself with earlier this am. Case was discussed with house staff in details. Patient was seen and examined by myself with earlier this am. Case was discussed with house staff in details.  80 y/o F presents with right leg cellulitis with ulcers of the leg possibly vascular ulcer; outpatient wound culture growing pseudomonas and Klebsielleae.  also with LE edema; and morbid obesity.  - IV antibiotics started; ID consulted  - continue with local wound care and antibiotic  - IV lasix for LE edema and mild CHF exacerbation  - OOB to chair  - PT as tolerated  - Other plan as detailed above Patient was seen and examined by myself with earlier this am. Case was discussed with house staff in details.  80 y/o F presents with right leg cellulitis with ulcers of the leg possibly vascular ulcer; outpatient wound culture growing pseudomonas and Klebsielleae.  also with LE edema; and morbid obesity.  - IV antibiotics started; ID consulted  - continue with local wound care and antibiotic  - IV lasix for LE edema and mild CHF exacerbation  - OOB to chair  - PT as tolerated  - Other plan as detailed above.  Plan f care discussed with ID attending- continue cefepime, Vancomycin  added; will monitor vanco level.  Plan discussed with patient

## 2017-08-02 NOTE — H&P ADULT - PROBLEM SELECTOR PLAN 2
At home takes the Levemir 25 Unit in AM, 35 Unit at night, continue with Lantus here  Hold the metformin 500 BID, glipizide 5mg BID  C/w HSS, Accu-Cheks

## 2017-08-02 NOTE — H&P ADULT - PROBLEM SELECTOR PLAN 4
Not in exacerbation  Continue with home meds furosemide 40 daily,   Primary team to follow up with Tribenzor 40/25 ( olmesartan combination) Not in exacerbation  Continue with home meds furosemide 40 daily,   Primary team to follow up with Tribenzor 40/25 ( olmesartan combination)  last echo grade 2 DD, mild MR, EF 55% Very mild CHF exacerbation with significant lower extremity edema and ARGUETA but no chest congestion- will place on IV Lasix x 24 hours and re assess switching back to home dose of furosemide 40 daily,   Primary team to follow up with Tribenzor 40/25 ( olmesartan combination)  last echo grade 2 DD, mild MR, EF 55%

## 2017-08-02 NOTE — ADVANCED PRACTICE NURSE CONSULT - ASSESSMENT
This is a 81yr old female patient admitted for Cellulitis, presenting with a R. Lower Extremity Ulceration, to which the patient is being followed by Podiatry with a treatment plan in place to address the patients issue. There is currently no need for wound care specialist consultation at this time.

## 2017-08-02 NOTE — H&P ADULT - PROBLEM SELECTOR PLAN 5
Noted to have AMARA also Cr is 1.74, BUN 42, BUN/Cr ratio is 24 , kalia pre-renal   On prior labs noted to have some fluctuating kidney function , no CKD  Check the UA, urine lytes , if worsens then need renal U/S  Avoid the nephrotoxic meds

## 2017-08-03 LAB
ANION GAP SERPL CALC-SCNC: 2 MMOL/L — LOW (ref 5–17)
BUN SERPL-MCNC: 26 MG/DL — HIGH (ref 7–18)
CALCIUM SERPL-MCNC: 8.8 MG/DL — SIGNIFICANT CHANGE UP (ref 8.4–10.5)
CHLORIDE SERPL-SCNC: 104 MMOL/L — SIGNIFICANT CHANGE UP (ref 96–108)
CO2 SERPL-SCNC: 34 MMOL/L — HIGH (ref 22–31)
CREAT SERPL-MCNC: 1.32 MG/DL — HIGH (ref 0.5–1.3)
GLUCOSE SERPL-MCNC: 107 MG/DL — HIGH (ref 70–99)
HCT VFR BLD CALC: 30.8 % — LOW (ref 34.5–45)
HGB BLD-MCNC: 9.9 G/DL — LOW (ref 11.5–15.5)
MCHC RBC-ENTMCNC: 32.2 GM/DL — SIGNIFICANT CHANGE UP (ref 32–36)
MCHC RBC-ENTMCNC: 32.6 PG — SIGNIFICANT CHANGE UP (ref 27–34)
MCV RBC AUTO: 101.1 FL — HIGH (ref 80–100)
PLATELET # BLD AUTO: 170 K/UL — SIGNIFICANT CHANGE UP (ref 150–400)
POTASSIUM SERPL-MCNC: 5 MMOL/L — SIGNIFICANT CHANGE UP (ref 3.5–5.3)
POTASSIUM SERPL-SCNC: 5 MMOL/L — SIGNIFICANT CHANGE UP (ref 3.5–5.3)
RBC # BLD: 3.05 M/UL — LOW (ref 3.8–5.2)
RBC # FLD: 12.1 % — SIGNIFICANT CHANGE UP (ref 10.3–14.5)
SODIUM SERPL-SCNC: 140 MMOL/L — SIGNIFICANT CHANGE UP (ref 135–145)
T3FREE SERPL-MCNC: 2.29 PG/ML — SIGNIFICANT CHANGE UP (ref 1.8–4.6)
T4 FREE SERPL-MCNC: 1.2 NG/DL — SIGNIFICANT CHANGE UP (ref 0.9–1.8)
WBC # BLD: 7.6 K/UL — SIGNIFICANT CHANGE UP (ref 3.8–10.5)
WBC # FLD AUTO: 7.6 K/UL — SIGNIFICANT CHANGE UP (ref 3.8–10.5)

## 2017-08-03 PROCEDURE — 99233 SBSQ HOSP IP/OBS HIGH 50: CPT | Mod: GC

## 2017-08-03 RX ADMIN — Medication 650 MILLIGRAM(S): at 06:58

## 2017-08-03 RX ADMIN — CEFEPIME 100 MILLIGRAM(S): 1 INJECTION, POWDER, FOR SOLUTION INTRAMUSCULAR; INTRAVENOUS at 05:52

## 2017-08-03 RX ADMIN — Medication 100 MILLIGRAM(S): at 21:49

## 2017-08-03 RX ADMIN — Medication 50 MILLIGRAM(S): at 17:09

## 2017-08-03 RX ADMIN — Medication 150 MILLIGRAM(S): at 06:15

## 2017-08-03 RX ADMIN — ATORVASTATIN CALCIUM 20 MILLIGRAM(S): 80 TABLET, FILM COATED ORAL at 21:50

## 2017-08-03 RX ADMIN — Medication 1 MILLIGRAM(S): at 12:32

## 2017-08-03 RX ADMIN — Medication 2: at 17:11

## 2017-08-03 RX ADMIN — PANTOPRAZOLE SODIUM 40 MILLIGRAM(S): 20 TABLET, DELAYED RELEASE ORAL at 06:01

## 2017-08-03 RX ADMIN — HEPARIN SODIUM 5000 UNIT(S): 5000 INJECTION INTRAVENOUS; SUBCUTANEOUS at 05:55

## 2017-08-03 RX ADMIN — HEPARIN SODIUM 5000 UNIT(S): 5000 INJECTION INTRAVENOUS; SUBCUTANEOUS at 21:50

## 2017-08-03 RX ADMIN — Medication 100 MILLIGRAM(S): at 17:09

## 2017-08-03 RX ADMIN — INSULIN GLARGINE 35 UNIT(S): 100 INJECTION, SOLUTION SUBCUTANEOUS at 21:50

## 2017-08-03 RX ADMIN — INSULIN GLARGINE 25 UNIT(S): 100 INJECTION, SOLUTION SUBCUTANEOUS at 08:21

## 2017-08-03 RX ADMIN — Medication 325 MILLIGRAM(S): at 12:32

## 2017-08-03 RX ADMIN — Medication 150 MILLIGRAM(S): at 17:09

## 2017-08-03 RX ADMIN — RANOLAZINE 1000 MILLIGRAM(S): 500 TABLET, FILM COATED, EXTENDED RELEASE ORAL at 06:01

## 2017-08-03 RX ADMIN — CEFEPIME 100 MILLIGRAM(S): 1 INJECTION, POWDER, FOR SOLUTION INTRAMUSCULAR; INTRAVENOUS at 17:09

## 2017-08-03 RX ADMIN — RANOLAZINE 1000 MILLIGRAM(S): 500 TABLET, FILM COATED, EXTENDED RELEASE ORAL at 17:11

## 2017-08-03 RX ADMIN — Medication 650 MILLIGRAM(S): at 17:09

## 2017-08-03 RX ADMIN — AMLODIPINE BESYLATE 5 MILLIGRAM(S): 2.5 TABLET ORAL at 06:01

## 2017-08-03 RX ADMIN — Medication 100 MILLIGRAM(S): at 05:54

## 2017-08-03 RX ADMIN — Medication 325 MILLIGRAM(S): at 17:09

## 2017-08-03 RX ADMIN — Medication 50 MILLIGRAM(S): at 06:02

## 2017-08-03 RX ADMIN — ZOLPIDEM TARTRATE 5 MILLIGRAM(S): 10 TABLET ORAL at 21:49

## 2017-08-03 RX ADMIN — Medication 325 MILLIGRAM(S): at 08:21

## 2017-08-03 RX ADMIN — Medication 650 MILLIGRAM(S): at 18:19

## 2017-08-03 RX ADMIN — CLOPIDOGREL BISULFATE 75 MILLIGRAM(S): 75 TABLET, FILM COATED ORAL at 12:32

## 2017-08-03 RX ADMIN — Medication 650 MILLIGRAM(S): at 05:53

## 2017-08-03 NOTE — PHYSICAL THERAPY INITIAL EVALUATION ADULT - LIVES WITH, PROFILE
spouse/Patient lives with her  in an apt, +elevator. Patient use a w/c and a rollator for short distances. Patient has HHA for 10 hr x 7 days

## 2017-08-03 NOTE — PROGRESS NOTE ADULT - SUBJECTIVE AND OBJECTIVE BOX
Patient is a 81y old  Female who presents with a chief complaint of sent by Dr Jensen for concern of cellulitis and IV antibiotics (02 Aug 2017 00:54)    REVIEW OF SYSTEMS:    CONSTITUTIONAL: No weakness, fevers or chills  RESPIRATORY: No cough, wheezing, hemoptysis; No shortness of breath  CARDIOVASCULAR: No chest pain or palpitations  GASTROINTESTINAL: No abdominal or epigastric pain. No nausea, vomiting, or hematemesis; No diarrhea or constipation. No melena or hematochezia.  NEUROLOGICAL: No numbness or weakness  All other review of systems is negative unless indicated above.    Overnight Events: feeling better overnight    MEDICATIONS  (STANDING):  insulin lispro (HumaLOG) corrective regimen sliding scale   SubCutaneous three times a day before meals  ferrous    sulfate 325 milliGRAM(s) Oral three times a day with meals  pantoprazole    Tablet 40 milliGRAM(s) Oral before breakfast  folic acid 1 milliGRAM(s) Oral daily  clopidogrel Tablet 75 milliGRAM(s) Oral daily  metoprolol 50 milliGRAM(s) Oral two times a day  ranolazine 1000 milliGRAM(s) Oral two times a day  atorvastatin 20 milliGRAM(s) Oral at bedtime  traZODone 100 milliGRAM(s) Oral daily  senna 2 Tablet(s) Oral at bedtime  docusate sodium 100 milliGRAM(s) Oral two times a day  insulin glargine Injectable (LANTUS) 25 Unit(s) SubCutaneous every morning  insulin glargine Injectable (LANTUS) 35 Unit(s) SubCutaneous at bedtime  sodium chloride 0.9%. 1000 milliLiter(s) (60 mL/Hr) IV Continuous <Continuous>  heparin  Injectable 5000 Unit(s) SubCutaneous every 8 hours  cefepime  IVPB 1000 milliGRAM(s) IV Intermittent every 12 hours  amLODIPine   Tablet 5 milliGRAM(s) Oral daily  vancomycin  IVPB   IV Intermittent   vancomycin  IVPB 750 milliGRAM(s) IV Intermittent every 12 hours  furosemide   Injectable 40 milliGRAM(s) IV Push daily    MEDICATIONS  (PRN):  zolpidem 5 milliGRAM(s) Oral at bedtime PRN Insomnia  acetaminophen   Tablet. 650 milliGRAM(s) Oral every 6 hours PRN Mild Pain (1 - 3)  aluminum hydroxide/magnesium hydroxide/simethicone Suspension 30 milliLiter(s) Oral every 6 hours PRN Dyspepsia    Vital Signs Last 24 Hrs  T(C): 36.4 (03 Aug 2017 05:35), Max: 37.1 (02 Aug 2017 21:00)  T(F): 97.6 (03 Aug 2017 05:35), Max: 98.7 (02 Aug 2017 21:00)  HR: 68 (03 Aug 2017 05:35) (68 - 69)  BP: 146/80 (03 Aug 2017 05:35) (137/60 - 149/73)  BP(mean): --  RR: 17 (03 Aug 2017 05:35) (17 - 17)  SpO2: 96% (03 Aug 2017 05:35) (96% - 99%)    General: WN/WD NAD  Neurology: A&Ox3  Respiratory: CTA B/L, no wheezes, no rhonchi, no rales  CV: RRR, S1S2, no murmur  Abdominal: Soft, NT, ND no palpable mass, bowel sounds positive  MSK: No edema, + peripheral pulses      Labs:                        9.9    7.6   )-----------( 170      ( 03 Aug 2017 06:17 )             30.8     08-03    140  |  104  |  26<H>  ----------------------------<  107<H>  5.0   |  34<H>  |  1.32<H>    Ca    8.8      03 Aug 2017 06:17  Phos  2.5     08-02  Mg     2.0     08-02    TPro  7.7  /  Alb  2.9<L>  /  TBili  0.3  /  DBili  x   /  AST  22  /  ALT  23  /  AlkPhos  52  08-02    08-02 Chol 151 LDL 61 HDL 41 Trig 246<H> Patient is a 81y old  Female who presents with a chief complaint of sent by Dr Jensen for concern of cellulitis and IV antibiotics (02 Aug 2017 00:54)    REVIEW OF SYSTEMS:    CONSTITUTIONAL: No weakness, fevers or chills  RESPIRATORY: No cough, wheezing, hemoptysis; No shortness of breath  CARDIOVASCULAR: No chest pain or palpitations  GASTROINTESTINAL: No abdominal or epigastric pain. No nausea, vomiting, or hematemesis; No diarrhea or constipation. No melena or hematochezia.  NEUROLOGICAL: No numbness or weakness  All other review of systems is negative unless indicated above.    Overnight Events: feeling better overnight    MEDICATIONS  (STANDING):  insulin lispro (HumaLOG) corrective regimen sliding scale   SubCutaneous three times a day before meals  ferrous    sulfate 325 milliGRAM(s) Oral three times a day with meals  pantoprazole    Tablet 40 milliGRAM(s) Oral before breakfast  folic acid 1 milliGRAM(s) Oral daily  clopidogrel Tablet 75 milliGRAM(s) Oral daily  metoprolol 50 milliGRAM(s) Oral two times a day  ranolazine 1000 milliGRAM(s) Oral two times a day  atorvastatin 20 milliGRAM(s) Oral at bedtime  traZODone 100 milliGRAM(s) Oral daily  senna 2 Tablet(s) Oral at bedtime  docusate sodium 100 milliGRAM(s) Oral two times a day  insulin glargine Injectable (LANTUS) 25 Unit(s) SubCutaneous every morning  insulin glargine Injectable (LANTUS) 35 Unit(s) SubCutaneous at bedtime  sodium chloride 0.9%. 1000 milliLiter(s) (60 mL/Hr) IV Continuous <Continuous>  heparin  Injectable 5000 Unit(s) SubCutaneous every 8 hours  cefepime  IVPB 1000 milliGRAM(s) IV Intermittent every 12 hours  amLODIPine   Tablet 5 milliGRAM(s) Oral daily  vancomycin  IVPB   IV Intermittent   vancomycin  IVPB 750 milliGRAM(s) IV Intermittent every 12 hours  furosemide   Injectable 40 milliGRAM(s) IV Push daily    MEDICATIONS  (PRN):  zolpidem 5 milliGRAM(s) Oral at bedtime PRN Insomnia  acetaminophen   Tablet. 650 milliGRAM(s) Oral every 6 hours PRN Mild Pain (1 - 3)  aluminum hydroxide/magnesium hydroxide/simethicone Suspension 30 milliLiter(s) Oral every 6 hours PRN Dyspepsia    Vital Signs Last 24 Hrs  T(C): 36.4 (03 Aug 2017 05:35), Max: 37.1 (02 Aug 2017 21:00)  T(F): 97.6 (03 Aug 2017 05:35), Max: 98.7 (02 Aug 2017 21:00)  HR: 68 (03 Aug 2017 05:35) (68 - 69)  BP: 146/80 (03 Aug 2017 05:35) (137/60 - 149/73)  BP(mean): --  RR: 17 (03 Aug 2017 05:35) (17 - 17)  SpO2: 96% (03 Aug 2017 05:35) (96% - 99%)    General:  NAD; obese  Neurology: A&Ox3  Respiratory: CTA B/L, no wheezes, no rhonchi, no rales  CV: RRR, S1S2, no murmur  Abdominal: Soft, NT, ND no palpable mass, bowel sounds positive  MSK: bilateral LE edema with right> left + peripheral pulses  SKIN- right lateral distal leg ulcers      Labs:                        9.9    7.6   )-----------( 170      ( 03 Aug 2017 06:17 )             30.8     08-03    140  |  104  |  26<H>  ----------------------------<  107<H>  5.0   |  34<H>  |  1.32<H>    Ca    8.8      03 Aug 2017 06:17  Phos  2.5     08-02  Mg     2.0     08-02    TPro  7.7  /  Alb  2.9<L>  /  TBili  0.3  /  DBili  x   /  AST  22  /  ALT  23  /  AlkPhos  52  08-02    08-02 Chol 151 LDL 61 HDL 41 Trig 246<H>

## 2017-08-03 NOTE — PROGRESS NOTE ADULT - PROBLEM SELECTOR PLAN 4
Very mild CHF exacerbation with significant lower extremity edema and ARGUETA but no chest congestion  -will continue IV lasix for leg swelling, Cr is currently still improving and acceptable  -will change to PO lasix once swelling in right foot has improved  last echo grade 2 DD, mild MR, EF 55%

## 2017-08-03 NOTE — PROGRESS NOTE ADULT - PROBLEM SELECTOR PLAN 1
Right leg cellulitis with open ulcers on the distal third of right leg; increased warmth and erythema with 2 + pitting edema, no discharge and no obvious signs of abscess collection  Afebrile, no wbc count, X-ray tibia/ fibula negative for any fracture, gas , lactate normal   Continue with cefepime 1 gm q12, started on Vanc by ID  ID Dr Heard   Doppler negative for DVT  C/w Lasix for leg edema

## 2017-08-03 NOTE — PROGRESS NOTE ADULT - ASSESSMENT
1.	Right leg cellulitis 2/2 infected leg ulcer  ·	continue maxipime 1gm IV q12h D3  ·	continue vanco 750mg IV q12h D2  ·	awaiting culture sensitivity

## 2017-08-03 NOTE — PROGRESS NOTE ADULT - PROBLEM SELECTOR PLAN 5
Noted to have AMARA also Cr is 1.74, BUN 42, BUN/Cr ratio is 24 , kalia pre-renal   On prior labs noted to have some fluctuating kidney function , no CKD  -Cr is improving even on lasix, will continue IV lasix and monitor BMP

## 2017-08-03 NOTE — PROGRESS NOTE ADULT - PROBLEM SELECTOR PLAN 2
At home takes the Levemir 25 Unit in AM, 35 Unit at night, continue with Lantus here  Hold the metformin 500 BID, glipizide 5mg BID  -FS acceptable in the hospital  C/w HSS

## 2017-08-03 NOTE — PHYSICAL THERAPY INITIAL EVALUATION ADULT - GENERAL OBSERVATIONS, REHAB EVAL
Patient received supine on bed in NAD. Patient is [place] speaking and I was able to communicate with patient in her native language

## 2017-08-03 NOTE — PROGRESS NOTE ADULT - SUBJECTIVE AND OBJECTIVE BOX
81 year old female is under our care for right leg cellulitis 2/2 infected leg wound. Patient admits that right leg pain has slightly improved today. Still has significant pain around wound region. Has not had any fevers and wbc count is normal.    REVIEW OF SYSTEMS:  [  ] Not able to illicit  General: no fevers no malaise   Chest: no cough no SOB   Skin: right leg warmth and swelling  Musculoskeletal: R>L leg apin	    ALLERGIES: No Known Allergies    MEDS:  cefepime  IVPB 1000 milliGRAM(s) IV Intermittent every 12 hours     VITALS:  Vital Signs Last 24 Hrs  T(C): 37 (03 Aug 2017 13:47), Max: 37.1 (02 Aug 2017 21:00)  T(F): 98.6 (03 Aug 2017 13:47), Max: 98.7 (02 Aug 2017 21:00)  HR: 72 (03 Aug 2017 13:47) (68 - 74)  BP: 170/78 (03 Aug 2017 13:47) (137/60 - 170/78)  BP(mean): --  RR: 16 (03 Aug 2017 13:47) (16 - 17)  SpO2: 97% (03 Aug 2017 13:47) (96% - 97%)    PHYSICAL EXAM:  HEENT: normocephalic with moist oral mucosa  Neck: supple short neck no LN's No JVD  Respiratory: lungs clear no rales no wheezing  Cardiovascular: S1 S2 reg no murmurs  Gastrointestinal: soft, large abdominal pannus with +BS; nontender and nondistended   Extremities: R>L LE edema  Skin: RLE mild erythema with faint warmth  +pain with palpation  Right leg wrapped  Ortho: no jt swelling  Neuro: AAO x 3    LABS/DIAGNOSTIC TESTS:                        9.9    7.6   )-----------( 170      ( 03 Aug 2017 06:17 )             30.8   08-03    140  |  104  |  26<H>  ----------------------------<  107<H>  5.0   |  34<H>  |  1.32<H> 1.48 < 1.74    Ca    8.8      03 Aug 2017 06:17  Phos  2.5     08-02  Mg     2.0     08-02    TPro  7.7  /  Alb  2.9<L>  /  TBili  0.3  /  DBili  x   /  AST  22  /  ALT  23  /  AlkPhos  52  08-02    CULTURES:   Culture - Surgical Swab (08.01.17 @ 17:19)    Specimen Source: .Surgical Swab right lateral leg ulceration    Culture Results:   Few Enterococcus faecalis  Moderate Pseudomonas aeruginosa    Culture - Blood (08.01.17 @ 17:27)    Specimen Source: .Blood Blood-Arterial    Culture Results:   No growth to date.    Culture - Blood (08.01.17 @ 17:27)    Specimen Source: .Blood Blood-Arterial    Culture Results:   No growth to date.    RADIOLOGY: no new studies

## 2017-08-03 NOTE — PHYSICAL THERAPY INITIAL EVALUATION ADULT - PLANNED THERAPY INTERVENTIONS, PT EVAL
strengthening/bed mobility training/gait training/ROM/neuromuscular re-education/postural re-education/balance training

## 2017-08-03 NOTE — PROGRESS NOTE ADULT - ASSESSMENT
In the ED vitals stable, hb is 10.3, Cr of 1.74, lactate normal, CXR clear, Xray tibia, fibula negative for any gas or fracture, blood and surgical site culture sent. Got NS bolus, cefeipime 1 gm. Seen by podiatry , dressing applied. EKG sinus arrythmia, irregular , T wave flattening  in lead V1, V2 , Avl,  Admitted to medicine floor for cellulitis . 81y old  Female with PMH of HTN, DM, and obesity sent by primary podiatrist Mikey for poorly healing and infected leg ulcers with progressive cellulitis requiring IV antibiotics

## 2017-08-03 NOTE — PROGRESS NOTE ADULT - PROBLEM SELECTOR PLAN 3
Continue with home med metoprolol 50 BID + Lasix for HF  BP stable in ED   Also patient taking Tribenzor 40/25 ( olmesartan/norvasc/HCTZ combination) , restarted on the norvasc component yesterday, Bp control better  -will wait for resolution of AMARA before restarting the ARB component Continue with home med metoprolol 50 BID + Lasix for HF  BP stable in ED   Also patient taking Tribenzor 40/25 ( olmesartan /norvasc /HCTZ combination) , restarted on the norvasc component yesterday, Bp control better  -will wait for resolution of AMARA before restarting the ARB component

## 2017-08-04 VITALS
TEMPERATURE: 99 F | RESPIRATION RATE: 16 BRPM | DIASTOLIC BLOOD PRESSURE: 83 MMHG | HEART RATE: 72 BPM | SYSTOLIC BLOOD PRESSURE: 157 MMHG | OXYGEN SATURATION: 96 %

## 2017-08-04 LAB
ANION GAP SERPL CALC-SCNC: 6 MMOL/L — SIGNIFICANT CHANGE UP (ref 5–17)
BUN SERPL-MCNC: 19 MG/DL — HIGH (ref 7–18)
CALCIUM SERPL-MCNC: 8.7 MG/DL — SIGNIFICANT CHANGE UP (ref 8.4–10.5)
CHLORIDE SERPL-SCNC: 104 MMOL/L — SIGNIFICANT CHANGE UP (ref 96–108)
CO2 SERPL-SCNC: 31 MMOL/L — SIGNIFICANT CHANGE UP (ref 22–31)
CREAT SERPL-MCNC: 1.16 MG/DL — SIGNIFICANT CHANGE UP (ref 0.5–1.3)
GLUCOSE SERPL-MCNC: 66 MG/DL — LOW (ref 70–99)
HCT VFR BLD CALC: 31.3 % — LOW (ref 34.5–45)
HGB BLD-MCNC: 10.2 G/DL — LOW (ref 11.5–15.5)
MCHC RBC-ENTMCNC: 32.8 GM/DL — SIGNIFICANT CHANGE UP (ref 32–36)
MCHC RBC-ENTMCNC: 33.6 PG — SIGNIFICANT CHANGE UP (ref 27–34)
MCV RBC AUTO: 102.8 FL — HIGH (ref 80–100)
PLATELET # BLD AUTO: 166 K/UL — SIGNIFICANT CHANGE UP (ref 150–400)
POTASSIUM SERPL-MCNC: 4.4 MMOL/L — SIGNIFICANT CHANGE UP (ref 3.5–5.3)
POTASSIUM SERPL-SCNC: 4.4 MMOL/L — SIGNIFICANT CHANGE UP (ref 3.5–5.3)
RBC # BLD: 3.05 M/UL — LOW (ref 3.8–5.2)
RBC # FLD: 12.1 % — SIGNIFICANT CHANGE UP (ref 10.3–14.5)
SODIUM SERPL-SCNC: 141 MMOL/L — SIGNIFICANT CHANGE UP (ref 135–145)
VANCOMYCIN TROUGH SERPL-MCNC: 12.5 UG/ML — SIGNIFICANT CHANGE UP (ref 10–20)
WBC # BLD: 7.4 K/UL — SIGNIFICANT CHANGE UP (ref 3.8–10.5)
WBC # FLD AUTO: 7.4 K/UL — SIGNIFICANT CHANGE UP (ref 3.8–10.5)

## 2017-08-04 PROCEDURE — 99239 HOSP IP/OBS DSCHRG MGMT >30: CPT

## 2017-08-04 RX ORDER — ZOLPIDEM TARTRATE 10 MG/1
1 TABLET ORAL
Qty: 0 | Refills: 0 | COMMUNITY
Start: 2017-08-04

## 2017-08-04 RX ORDER — VANCOMYCIN HCL 1 G
1000 VIAL (EA) INTRAVENOUS EVERY 12 HOURS
Qty: 0 | Refills: 0 | Status: DISCONTINUED | OUTPATIENT
Start: 2017-08-04 | End: 2017-08-04

## 2017-08-04 RX ORDER — CLOPIDOGREL BISULFATE 75 MG/1
1 TABLET, FILM COATED ORAL
Qty: 0 | Refills: 0 | COMMUNITY
Start: 2017-08-04

## 2017-08-04 RX ORDER — NIFEDIPINE 30 MG
30 TABLET, EXTENDED RELEASE 24 HR ORAL DAILY
Qty: 0 | Refills: 0 | Status: DISCONTINUED | OUTPATIENT
Start: 2017-08-04 | End: 2017-08-04

## 2017-08-04 RX ORDER — FUROSEMIDE 40 MG
40 TABLET ORAL DAILY
Qty: 0 | Refills: 0 | Status: DISCONTINUED | OUTPATIENT
Start: 2017-08-05 | End: 2017-08-04

## 2017-08-04 RX ORDER — RANOLAZINE 500 MG/1
1 TABLET, FILM COATED, EXTENDED RELEASE ORAL
Qty: 60 | Refills: 0 | OUTPATIENT
Start: 2017-08-04 | End: 2017-09-03

## 2017-08-04 RX ORDER — TRAZODONE HCL 50 MG
1 TABLET ORAL
Qty: 30 | Refills: 0 | OUTPATIENT
Start: 2017-08-04 | End: 2017-09-03

## 2017-08-04 RX ORDER — FUROSEMIDE 40 MG
1 TABLET ORAL
Qty: 0 | Refills: 0 | COMMUNITY
Start: 2017-08-04

## 2017-08-04 RX ORDER — FUROSEMIDE 40 MG
1 TABLET ORAL
Qty: 13 | Refills: 0 | OUTPATIENT
Start: 2017-08-04 | End: 2017-09-03

## 2017-08-04 RX ADMIN — HEPARIN SODIUM 5000 UNIT(S): 5000 INJECTION INTRAVENOUS; SUBCUTANEOUS at 13:22

## 2017-08-04 RX ADMIN — AMLODIPINE BESYLATE 5 MILLIGRAM(S): 2.5 TABLET ORAL at 05:13

## 2017-08-04 RX ADMIN — Medication 325 MILLIGRAM(S): at 08:10

## 2017-08-04 RX ADMIN — Medication 30 MILLIGRAM(S): at 09:08

## 2017-08-04 RX ADMIN — RANOLAZINE 1000 MILLIGRAM(S): 500 TABLET, FILM COATED, EXTENDED RELEASE ORAL at 05:13

## 2017-08-04 RX ADMIN — CEFEPIME 100 MILLIGRAM(S): 1 INJECTION, POWDER, FOR SOLUTION INTRAMUSCULAR; INTRAVENOUS at 05:13

## 2017-08-04 RX ADMIN — HEPARIN SODIUM 5000 UNIT(S): 5000 INJECTION INTRAVENOUS; SUBCUTANEOUS at 05:13

## 2017-08-04 RX ADMIN — Medication 650 MILLIGRAM(S): at 03:19

## 2017-08-04 RX ADMIN — Medication 100 MILLIGRAM(S): at 12:41

## 2017-08-04 RX ADMIN — Medication 650 MILLIGRAM(S): at 12:43

## 2017-08-04 RX ADMIN — Medication 150 MILLIGRAM(S): at 06:59

## 2017-08-04 RX ADMIN — Medication 325 MILLIGRAM(S): at 12:41

## 2017-08-04 RX ADMIN — Medication 650 MILLIGRAM(S): at 04:44

## 2017-08-04 RX ADMIN — Medication 50 MILLIGRAM(S): at 05:13

## 2017-08-04 RX ADMIN — CLOPIDOGREL BISULFATE 75 MILLIGRAM(S): 75 TABLET, FILM COATED ORAL at 12:41

## 2017-08-04 RX ADMIN — Medication 1 MILLIGRAM(S): at 12:42

## 2017-08-04 RX ADMIN — Medication 650 MILLIGRAM(S): at 12:15

## 2017-08-04 RX ADMIN — PANTOPRAZOLE SODIUM 40 MILLIGRAM(S): 20 TABLET, DELAYED RELEASE ORAL at 05:13

## 2017-08-04 NOTE — DISCHARGE NOTE ADULT - MEDICATION SUMMARY - MEDICATIONS TO STOP TAKING
I will STOP taking the medications listed below when I get home from the hospital:    lisinopril 20 mg oral tablet  -- 1 tab(s) by mouth once a day    insulin lispro 100 units/mL subcutaneous solution  --  subcutaneous 3 times a day (before meals); 1 Unit(s) if Glucose 151 - 200  2 Unit(s) if Glucose 201 - 250  3 Unit(s) if Glucose 251 - 300  4 Unit(s) if Glucose 301 - 350  5 Unit(s) if Glucose 351 - 400  6 Unit(s) if Glucose Greater Than 400    hydrALAZINE 50 mg oral tablet  -- 1 tab(s) by mouth every 8 hours

## 2017-08-04 NOTE — DISCHARGE NOTE ADULT - MEDICATION SUMMARY - MEDICATIONS TO TAKE
I will START or STAY ON the medications listed below when I get home from the hospital:    ranolazine 1000 mg oral tablet, extended release  -- 1 tab(s) by mouth 2 times a day  -- Indication: For Coronary artery disease    traZODone 100 mg oral tablet  -- 1 tab(s) by mouth once a day  -- Indication: For Insomnia    LEVEMIR FLEXTOUCH 100 UNITS/ML  -- Indication: For Diabetes mellitus    GLIPIZIDE-METFORMIN 5-500 MG  -- Indication: For Diabetes mellitus    atorvastatin 40 mg oral tablet  -- 1 tab(s) by mouth once a day (at bedtime)  -- Indication: For Coronary artery disease    OLMSRTN-AMLDPN-HCTZ 40-5-25 MG  -- Indication: For Heart failure    clopidogrel 75 mg oral tablet  -- 1 tab(s) by mouth once a day  -- Indication: For Heart failure    zolpidem 5 mg oral tablet  -- 1 tab(s) by mouth once a day (at bedtime), As needed, Insomnia  -- Indication: For Insomnia    metoprolol tartrate 25 mg oral tablet  -- 1 tab(s) by mouth every 12 hours  -- Indication: For Heart failure    furosemide 40 mg oral tablet  -- 1 tab(s) by mouth 3 times a week  -- Indication: For Heart failure    ferrous sulfate 325 mg oral delayed release tablet  -- 1 tab(s) by mouth 3 times a day (with meals)  -- Indication: For Anemia    senna oral tablet  -- 2 tab(s) by mouth once a day (at bedtime)  -- Indication: For Constipation    docusate sodium 100 mg oral capsule  -- 1 cap(s) by mouth 2 times a day  -- Indication: For Constipation    pantoprazole 40 mg oral delayed release tablet  -- 1 tab(s) by mouth once a day  -- Indication: For GERD    ergocalciferol 50,000 intl units oral capsule  -- 1 cap(s) by mouth once a week  -- Indication: For Vitamin deficiency    folic acid 1 mg oral tablet  -- 1 tab(s) by mouth once a day  -- Indication: For Vitamin deficiency

## 2017-08-04 NOTE — PROGRESS NOTE ADULT - SUBJECTIVE AND OBJECTIVE BOX
81 year old female is under our care for right leg cellulitis 2/2 infected leg wound. Patient is very enthusiastic today and admits that right leg pain has improved. Remains afebrile and wbc count is normal.  Due for dc home today o po antibiotics.    REVIEW OF SYSTEMS:  [  ] Not able to illicit  General: no fevers no malaise    Skin: +right swelling -improved leg erythema  Musculoskeletal: improved leg pain    ALLERGIES: No Known Allergies    MEDS:  cefepime IVPB 1000 milliGRAM(s) IV Intermittent every 12 hours     VITALS:  Vital Signs Last 24 Hrs  T(C): 36.6 (04 Aug 2017 09:12), Max: 37 (03 Aug 2017 20:55)  T(F): 97.9 (04 Aug 2017 09:12), Max: 98.6 (03 Aug 2017 20:55)  HR: 64 (04 Aug 2017 09:12) (64 - 65)  BP: 136/70 (04 Aug 2017 09:12) (136/70 - 176/75)  BP(mean): --  RR: 18 (04 Aug 2017 09:12) (16 - 18)  SpO2: 97% (04 Aug 2017 09:12) (95% - 97%)    PHYSICAL EXAM:  HEENT: n/a  Neck: supple no LN's  Respiratory: lungs clear no rales no wheezing  Cardiovascular: S1 S2 reg no murmurs  Gastrointestinal: soft, large abdominal pannus with +BS; nontender and nondistended   Extremities: R>L LE edema, noted decreased swelling of right lower leg from wrappings  Skin: improved RLE erythema with faint warmth  +pain with palpation of ulcer.   Wound appears more dry with only scant serous oozing  Ortho: no jt swelling  Neuro: AAO x 3    LABS/DIAGNOSTIC TESTS:                       10.2   7.4   )-----------( 166      ( 04 Aug 2017 05:12 )             31.3   08-04    141  |  104  |  19<H>  ----------------------------<  66<L>  4.4   |  31  |  1.16 < 1.32 < 1.48    Ca    8.7      04 Aug 2017 05:12      CULTURES:   Culture - Surgical Swab (08.01.17 @ 17:19)    Specimen Source: .Surgical Swab right lateral leg ulceration    Culture Results:   Few Enterococcus faecalis  Moderate Pseudomonas aeruginosa    Culture - Blood (08.01.17 @ 17:27)    Specimen Source: .Blood Blood-Arterial    Culture Results:   No growth to date.    Culture - Blood (08.01.17 @ 17:27)    Specimen Source: .Blood Blood-Arterial    Culture Results:   No growth to date.    RADIOLOGY: no new studies

## 2017-08-04 NOTE — PROGRESS NOTE ADULT - PROBLEM SELECTOR PLAN 1
Right leg cellulitis with open ulcers on the distal third of right leg; -   clinically improved  switch to oral antibiotics  follow up with podiatrist outpatient  discharge planning today

## 2017-08-04 NOTE — DISCHARGE NOTE ADULT - SECONDARY DIAGNOSIS.
Heart failure, chronic, diastolic Diabetes HTN (hypertension) AMARA (acute kidney injury) Depression

## 2017-08-04 NOTE — DISCHARGE NOTE ADULT - PATIENT PORTAL LINK FT
“You can access the [facility] Patient Portal, offered by [facility], by registering with the following website: [email or web address]”

## 2017-08-04 NOTE — PROGRESS NOTE ADULT - ASSESSMENT
1.	Right leg cellulitis 2/2 infected leg ulcer  ·	dc planning today  ·	dc on augmentin 875mg PO BID x 7 days  ·	reconsult prn 1.	Right leg cellulitis 2/2 infected leg ulcer, i don't feel pseudomonas is causing cellulitis and is just colonising ulcer  cont dual compression dressings  ·	dc planning today  ·	dc on augmentin 875mg PO BID x 7 days  ·	reconsult prn

## 2017-08-04 NOTE — DISCHARGE NOTE ADULT - CARE PLAN
Principal Discharge DX:	Cellulitis  Goal:	Resolve infection  Instructions for follow-up, activity and diet:	Please take 7 more days of Augmentin to resolve the infection in your legs. You will need to care for the wound carefully to prevent worsening. Please continue to wrap a compressive dressing on your legs to prevent worsening of the cellulitis and to help the ulcer heal. Please control your heart failure well to also prevent worsening of your legs.     Wound care instructions:  Please cleanse the ulcer with normal saline and cover with a 4x4 piece of gauze  Please wrap your legs from the ankle to the knee with cotton Carlos wrap  Then please wrap a 4inch ACE wrap over the cotton Carlos to compress the legs  Secondary Diagnosis:	Heart failure, chronic, diastolic  Goal:	Improve diuresis to treat the heart failure  Instructions for follow-up, activity and diet:	Please continue your home regimen of treatment of heart failure with TriBenZor and metoprolol and from now on, take Lasix 40mg PO tablet 3 times a week on Mondays, Wednesday and Friday. Please follow up with your PCP within 1 week for repeat lab tests to check for electrolyte balance.  Secondary Diagnosis:	Diabetes  Goal:	Control sugars  Instructions for follow-up, activity and diet:	Please continue your home regimen for controlling your blood sugars. Your diabetes is quite well controlled with an A1c of 6.2%. Please follow up with your PCP for continued diabetic care.  Secondary Diagnosis:	HTN (hypertension)  Goal:	Control blood pressure  Instructions for follow-up, activity and diet:	Please continue your home regimen for blood pressure control. Please follow up with your PCP for routine blood pressure checks and medication adjustments.  Secondary Diagnosis:	AMARA (acute kidney injury)  Goal:	Resolved, avoid fluid overload  Instructions for follow-up, activity and diet:	You had acute kidney injury secondary to fluid overload which improved with IV lasix diuresis. Please take your daily TriBenZor and your three times a week lasix to avoid fluid overload.  Secondary Diagnosis:	Depression  Goal:	Continue psychiatric medications.  Instructions for follow-up, activity and diet:	Please continue taking Trazodone as it has been controlling your mood well.

## 2017-08-04 NOTE — PROGRESS NOTE ADULT - ASSESSMENT
81y old  Female with PMH of HTN, DM, and obesity sent by primary podiatrist Mikey for poorly healing and infected leg ulcers with progressive cellulitis requiring IV antibiotics

## 2017-08-04 NOTE — DISCHARGE NOTE ADULT - HOSPITAL COURSE
81 year old female, uses wheel chair, HHA 10 hrs/ day,  from home past medical history of diabetes, diabetic neuropathy, hypertension, hyperlipidemia, depression, heart failure, constipation and recently diagnosed with klebsiella and pseudomonas bacteremia was sent by podiatry Dr Jensen for evaluation of worsening of right leg redness and swelling. As per patient, the podiatrist is concerned she might have cellulitis and requires iv antibiotics. Denies recent cough, fever, shortness of breath, abdominal pain, diarrhea, dysuria or other complaints at this time. As per the patient this has been going on for 6 weeks. She has bilateral leg swelling, also noticed blisters that sometimes turn into wounds on leg. She has her appointment for MRI at [facility] She has colonoscopy and EGD about 2 yrs ago with normal results.     In the hospital, patient was treated for an ulcer and cellulitis of the leg secondary to poorly controlled heart failure with venous stasis in the legs. Patient was treated with IV antibiotics and given a compressive wrap of the legs to avoid fluid overload and stasis. Patient's condition improved greatly with treatment and now patient is medically stable for discharge. Patient's heart failure control was poor due to incomplete diuretic treatment so lasix was added to her heart failure regimen to improve fluid status. Patient is recommended to continue with leg wrapping. Patient is medically stable for discharge with wound care and to complete antibiotic therapy. Plan discussed and agreed with Dr. Burleson.

## 2017-08-04 NOTE — DISCHARGE NOTE ADULT - PLAN OF CARE
Resolve infection Please take 7 more days of Augmentin to resolve the infection in your legs. You will need to care for the wound carefully to prevent worsening. Please continue to wrap a compressive dressing on your legs to prevent worsening of the cellulitis and to help the ulcer heal. Please control your heart failure well to also prevent worsening of your legs.     Wound care instructions:  Please cleanse the ulcer with normal saline and cover with a 4x4 piece of gauze  Please wrap your legs from the ankle to the knee with cotton Carlos wrap  Then please wrap a 4inch ACE wrap over the cotton Carlos to compress the legs Improve diuresis to treat the heart failure Please continue your home regimen of treatment of heart failure with TriBenZor and metoprolol and from now on, take Lasix 40mg PO tablet 3 times a week on Mondays, Wednesday and Friday. Please follow up with your PCP within 1 week for repeat lab tests to check for electrolyte balance. Control sugars Please continue your home regimen for controlling your blood sugars. Your diabetes is quite well controlled with an A1c of 6.2%. Please follow up with your PCP for continued diabetic care. Control blood pressure Please continue your home regimen for blood pressure control. Please follow up with your PCP for routine blood pressure checks and medication adjustments. Resolved, avoid fluid overload You had acute kidney injury secondary to fluid overload which improved with IV lasix diuresis. Please take your daily TriBenZor and your three times a week lasix to avoid fluid overload. Continue psychiatric medications. Please continue taking Trazodone as it has been controlling your mood well.

## 2017-08-04 NOTE — PROGRESS NOTE ADULT - SUBJECTIVE AND OBJECTIVE BOX
MEDICAL ATTENDING NOTE    Patient is a 81y old  Female who presents with a chief complaint of sent by Dr Jensen for concern of cellulitis and IV antibiotics (02 Aug 2017 00:54)      INTERVAL HPI/ OVERNIGHT EVENTS: no new complaints; feels ok today    MEDICATIONS  (STANDING):  insulin lispro (HumaLOG) corrective regimen sliding scale   Sub Cutaneous three times a day before meals  ferrous    sulfate 325 milliGRAM(s) Oral three times a day with meals  pantoprazole    Tablet 40 milliGRAM(s) Oral before breakfast  folic acid 1 milliGRAM(s) Oral daily  clopidogrel Tablet 75 milliGRAM(s) Oral daily  metoprolol 50 milliGRAM(s) Oral two times a day  ranolazine 1000 milliGRAM(s) Oral two times a day  atorvastatin 20 milliGRAM(s) Oral at bedtime  traZODone 100 milliGRAM(s) Oral daily  senna 2 Tablet(s) Oral at bedtime  docusate sodium 100 milliGRAM(s) Oral two times a day  insulin glargine Injectable (LANTUS) 25 Unit(s) SubCutaneous every morning  insulin glargine Injectable (LANTUS) 35 Unit(s) SubCutaneous at bedtime  sodium chloride 0.9%. 1000 milliLiter(s) (60 mL/Hr) IV Continuous <Continuous>  heparin  Injectable 5000 Unit(s) SubCutaneous every 8 hours  cefepime  IVPB 1000 milliGRAM(s) IV Intermittent every 12 hours  NIFEdipine XL 30 milliGRAM(s) Oral daily  vancomycin  IVPB 1000 milliGRAM(s) IV Intermittent every 12 hours    MEDICATIONS  (PRN):  zolpidem 5 milliGRAM(s) Oral at bedtime PRN Insomnia  acetaminophen   Tablet. 650 milliGRAM(s) Oral every 6 hours PRN Mild Pain (1 - 3)  aluminum hydroxide/magnesium hydroxide/simethicone Suspension 30 milliLiter(s) Oral every 6 hours PRN Dyspepsia      __________________________________________________  REVIEW OF SYSTEMS:    CONSTITUTIONAL: No fever,   NECK: No pain or stiffness  RESPIRATORY: No cough; No shortness of breath  CARDIOVASCULAR: No chest pain, no palpitations  GASTROINTESTINAL: No pain. No nausea or vomiting; No diarrhea           Vital Signs Last 24 Hrs  T(C): 36.8 (04 Aug 2017 13:59), Max: 37 (03 Aug 2017 20:55)  T(F): 98.3 (04 Aug 2017 13:59), Max: 98.6 (03 Aug 2017 20:55)  HR: 64 (04 Aug 2017 13:59) (64 - 65)  BP: 135/56 (04 Aug 2017 13:59) (135/56 - 176/75)  BP(mean): --  RR: 16 (04 Aug 2017 13:59) (16 - 18)  SpO2: 97% (04 Aug 2017 13:59) (95% - 97%)    ________________________________________________  PHYSICAL EXAM:  GENERAL: NAD  HEENT: Normocephalic;  conjunctivae and sclerae clear; moist mucous membranes;   NECK : supple  CHEST/LUNG: Clear to auscultation bilaterally with good air entry   HEART: S1 S2  regular  ABDOMEN: Soft, Nontender, Nondistended; Bowel sounds present  EXTREMITIES: no cyanosis; decreased LE edema; improved cellulitic changes; ulcers right LE  SKIN: warm and dry; no rash  NERVOUS SYSTEM:  Awake and alert; Oriented  to place, person and time ; no new deficits    _________________________________________________  LABS:                        10.2   7.4   )-----------( 166      ( 04 Aug 2017 05:12 )             31.3     08-04    141  |  104  |  19<H>  ----------------------------<  66<L>  4.4   |  31  |  1.16    Ca    8.7      04 Aug 2017 05:12          CAPILLARY BLOOD GLUCOSE  136 (04 Aug 2017 11:28)  93 (04 Aug 2017 07:45)  104 (03 Aug 2017 21:40)  147 (03 Aug 2017 16:20)            Consultant(s) Notes Reviewed:   YES      Plan of care was discussed with patient and care was aligned with patient's wishes.

## 2017-08-04 NOTE — PROGRESS NOTE ADULT - ATTENDING COMMENTS
I agree with above , will switch to po abxs based on cult results and plan to dc home in next 1-2 days with compression dressings.
Patient was seen and examined by myself. Case was discussed with house staff in details.  80 yo F with   1. cellulitis of the right leg  2. Infected ulcers of the right LE- Pseudomonas related  3. acute pain of the right leg due to infection  4. CHF exacerbation with LE edema requiring IV Lasix  5. Obesity  - continue with IV antibiotics  - Anticipate switch to oral antibiotics as she improves  - Local wound care  - pain control  - Elevate legs  - Improved with IV Lasix; switch to oral in am.  - OOB to chair  - PT as tolerated  - Other plan as outlined above in Dr Joya's
DISPO- stable for discharge.   Time spent to coordinate discharge is 32 min

## 2017-08-06 LAB
CULTURE RESULTS: SIGNIFICANT CHANGE UP
CULTURE RESULTS: SIGNIFICANT CHANGE UP
SPECIMEN SOURCE: SIGNIFICANT CHANGE UP
SPECIMEN SOURCE: SIGNIFICANT CHANGE UP

## 2017-08-08 DIAGNOSIS — Z79.4 LONG TERM (CURRENT) USE OF INSULIN: ICD-10-CM

## 2017-08-08 DIAGNOSIS — L03.115 CELLULITIS OF RIGHT LOWER LIMB: ICD-10-CM

## 2017-08-08 DIAGNOSIS — I11.0 HYPERTENSIVE HEART DISEASE WITH HEART FAILURE: ICD-10-CM

## 2017-08-08 DIAGNOSIS — N17.9 ACUTE KIDNEY FAILURE, UNSPECIFIED: ICD-10-CM

## 2017-08-08 DIAGNOSIS — E11.40 TYPE 2 DIABETES MELLITUS WITH DIABETIC NEUROPATHY, UNSPECIFIED: ICD-10-CM

## 2017-08-08 DIAGNOSIS — L97.919 NON-PRESSURE CHRONIC ULCER OF UNSPECIFIED PART OF RIGHT LOWER LEG WITH UNSPECIFIED SEVERITY: ICD-10-CM

## 2017-08-08 DIAGNOSIS — I87.8 OTHER SPECIFIED DISORDERS OF VEINS: ICD-10-CM

## 2017-08-08 DIAGNOSIS — F03.90 UNSPECIFIED DEMENTIA, UNSPECIFIED SEVERITY, WITHOUT BEHAVIORAL DISTURBANCE, PSYCHOTIC DISTURBANCE, MOOD DISTURBANCE, AND ANXIETY: ICD-10-CM

## 2017-08-08 DIAGNOSIS — E78.5 HYPERLIPIDEMIA, UNSPECIFIED: ICD-10-CM

## 2017-08-08 DIAGNOSIS — I50.33 ACUTE ON CHRONIC DIASTOLIC (CONGESTIVE) HEART FAILURE: ICD-10-CM

## 2017-08-08 DIAGNOSIS — E66.9 OBESITY, UNSPECIFIED: ICD-10-CM

## 2017-08-08 DIAGNOSIS — E11.622 TYPE 2 DIABETES MELLITUS WITH OTHER SKIN ULCER: ICD-10-CM

## 2017-08-08 DIAGNOSIS — Z91.81 HISTORY OF FALLING: ICD-10-CM

## 2017-08-08 DIAGNOSIS — Z95.5 PRESENCE OF CORONARY ANGIOPLASTY IMPLANT AND GRAFT: ICD-10-CM

## 2017-10-19 PROCEDURE — 83036 HEMOGLOBIN GLYCOSYLATED A1C: CPT

## 2017-10-19 PROCEDURE — 80048 BASIC METABOLIC PNL TOTAL CA: CPT

## 2017-10-19 PROCEDURE — 82306 VITAMIN D 25 HYDROXY: CPT

## 2017-10-19 PROCEDURE — 82607 VITAMIN B-12: CPT

## 2017-10-19 PROCEDURE — 93005 ELECTROCARDIOGRAM TRACING: CPT

## 2017-10-19 PROCEDURE — 84481 FREE ASSAY (FT-3): CPT

## 2017-10-19 PROCEDURE — 73590 X-RAY EXAM OF LOWER LEG: CPT

## 2017-10-19 PROCEDURE — 99285 EMERGENCY DEPT VISIT HI MDM: CPT | Mod: 25

## 2017-10-19 PROCEDURE — 93970 EXTREMITY STUDY: CPT

## 2017-10-19 PROCEDURE — 85652 RBC SED RATE AUTOMATED: CPT

## 2017-10-19 PROCEDURE — 82570 ASSAY OF URINE CREATININE: CPT

## 2017-10-19 PROCEDURE — 84100 ASSAY OF PHOSPHORUS: CPT

## 2017-10-19 PROCEDURE — 71045 X-RAY EXAM CHEST 1 VIEW: CPT

## 2017-10-19 PROCEDURE — 84300 ASSAY OF URINE SODIUM: CPT

## 2017-10-19 PROCEDURE — 81001 URINALYSIS AUTO W/SCOPE: CPT

## 2017-10-19 PROCEDURE — 83735 ASSAY OF MAGNESIUM: CPT

## 2017-10-19 PROCEDURE — 82746 ASSAY OF FOLIC ACID SERUM: CPT

## 2017-10-19 PROCEDURE — 96374 THER/PROPH/DIAG INJ IV PUSH: CPT

## 2017-10-19 PROCEDURE — 80061 LIPID PANEL: CPT

## 2017-10-19 PROCEDURE — 80202 ASSAY OF VANCOMYCIN: CPT

## 2017-10-19 PROCEDURE — 85027 COMPLETE CBC AUTOMATED: CPT

## 2017-10-19 PROCEDURE — 87040 BLOOD CULTURE FOR BACTERIA: CPT

## 2017-10-19 PROCEDURE — 84443 ASSAY THYROID STIM HORMONE: CPT

## 2017-10-19 PROCEDURE — 87070 CULTURE OTHR SPECIMN AEROBIC: CPT

## 2017-10-19 PROCEDURE — 80053 COMPREHEN METABOLIC PANEL: CPT

## 2017-10-19 PROCEDURE — 87186 SC STD MICRODIL/AGAR DIL: CPT

## 2017-10-19 PROCEDURE — 83605 ASSAY OF LACTIC ACID: CPT

## 2017-10-19 PROCEDURE — 84439 ASSAY OF FREE THYROXINE: CPT

## 2019-06-04 NOTE — PHYSICAL THERAPY INITIAL EVALUATION ADULT - PHYSICAL ASSIST/NONPHYSICAL ASSIST: GAIT, REHAB EVAL
Laceration to right hand between thumb and pointer finger.  Sg piece of metal.  Patient washed at home.    Tdap 2009.   1 person assist

## 2020-09-04 ENCOUNTER — EMERGENCY (EMERGENCY)
Facility: HOSPITAL | Age: 85
LOS: 1 days | Discharge: HOME | End: 2020-09-04
Attending: EMERGENCY MEDICINE
Payer: MEDICARE

## 2020-09-04 VITALS
TEMPERATURE: 98 F | OXYGEN SATURATION: 99 % | DIASTOLIC BLOOD PRESSURE: 92 MMHG | SYSTOLIC BLOOD PRESSURE: 172 MMHG | HEART RATE: 69 BPM | RESPIRATION RATE: 16 BRPM

## 2020-09-04 PROCEDURE — 99284 EMERGENCY DEPT VISIT MOD MDM: CPT

## 2020-09-04 NOTE — ED ADULT TRIAGE NOTE - CHIEF COMPLAINT QUOTE
as per family " sugar low   as per EMS patient was slurring and FS @ 28 ,9:25pm ;  given D10 and Glucagon 1gm , FS @156 @ 9:43pm.  Family denies contact or exposure to person positive Covid19

## 2020-09-05 PROCEDURE — 99283 EMERGENCY DEPT VISIT LOW MDM: CPT

## 2020-09-05 PROCEDURE — 82962 GLUCOSE BLOOD TEST: CPT

## 2020-09-05 NOTE — ED ADULT NURSE NOTE - OBJECTIVE STATEMENT
BIBA for episode of hypoglycemia, HHA came and said pt only ate 1x whole day yesterday.  Pt ate while in ED. A&O x self and place. Smiling, laughing, no distress

## 2020-09-05 NOTE — ED PROVIDER NOTE - CLINICAL SUMMARY MEDICAL DECISION MAKING FREE TEXT BOX
84 year old female with hypoglycemia. vitals WNL. PE as above.  pt given food in the ed. repeat fingerstick stable. advised to maintain po intake. f/u PMD. return precautions discussed.

## 2020-09-05 NOTE — ED PROVIDER NOTE - PATIENT PORTAL LINK FT
You can access the FollowMyHealth Patient Portal offered by [facility] by registering at the following website: [email or web address]. By joining [facility]’s FollowMyHealth portal, you will also be able to view your health information using other applications (apps) compatible with our system.

## 2020-09-05 NOTE — ED PROVIDER NOTE - OBJECTIVE STATEMENT
84 year old female PMh DM, HTN, HLD, CHF coming in with hypoglycemia. pt is mostly bedbound. noted to be lethargic. EMS called and fingerstick 24. given D50 and symptoms resolved. pt takes insulin. states ate today but less than usual.

## 2021-10-21 NOTE — PHYSICAL THERAPY INITIAL EVALUATION ADULT - PREDICTED DURATION OF THERAPY (DAYS/WKS), PT EVAL
Neurology Progress Note    S: Patient seen and examined on floor. no complaints dc planning     Medication:  MEDICATIONS  (STANDING):  aspirin  chewable 81 milliGRAM(s) Oral daily  atorvastatin 20 milliGRAM(s) Oral at bedtime  bisacodyl 5 milliGRAM(s) Oral at bedtime  cloNIDine 0.3 milliGRAM(s) Oral every 8 hours  clopidogrel Tablet 75 milliGRAM(s) Oral daily  doxazosin 4 milliGRAM(s) Oral at bedtime  hydrALAZINE 100 milliGRAM(s) Oral <User Schedule>  influenza   Vaccine 0.5 milliLiter(s) IntraMuscular once  isosorbide   mononitrate ER Tablet (IMDUR) 120 milliGRAM(s) Oral daily  lactulose Syrup 20 Gram(s) Oral two times a day  metoprolol succinate ER 25 milliGRAM(s) Oral two times a day  NIFEdipine XL 90 milliGRAM(s) Oral daily  polyethylene glycol 3350 17 Gram(s) Oral every 12 hours  senna 2 Tablet(s) Oral at bedtime  sodium bicarbonate 650 milliGRAM(s) Oral three times a day    MEDICATIONS  (PRN):  acetaminophen    Suspension .. 650 milliGRAM(s) Oral every 6 hours PRN Mild Pain (1 - 3)  simethicone 80 milliGRAM(s) Chew two times a day PRN Upset Stomach      Vitals:  Vital Signs Last 24 Hrs  T(C): 36.4 (10-21-21 @ 16:00), Max: 36.8 (10-21-21 @ 06:28)  T(F): 97.6 (10-21-21 @ 16:00), Max: 98.3 (10-21-21 @ 06:28)  HR: 50 (10-21-21 @ 16:00) (45 - 50)  BP: 130/71 (10-21-21 @ 16:00) (130/71 - 172/83)  BP(mean): --  RR: 18 (10-21-21 @ 16:00) (17 - 18)  SpO2: 99% (10-21-21 @ 16:00) (97% - 100%)    Orthostatic VS  10-20-21 @ 11:10  Lying BP: 183/88 HR: 46  Sitting BP: 163/81 HR: 48  Standing BP: 152/51 HR: --  Site: upper right arm  Mode: electronic    Orthostatic VS  10-20-21 @ 11:10  Lying BP: 183/88 HR: 46  Sitting BP: 163/81 HR: 48  Standing BP: 152/51 HR: --  Site: upper right arm  Mode: electronic      Orthostatic VS  10-16-21 @ 07:27  Lying BP: 198/98 HR: 46  Sitting BP: 182/95 HR: 50  Standing BP: 174/96 HR: 58  Site: --  Mode: --  Orthostatic VS  10-15-21 @ 16:00  Lying BP: 163/76 HR: 49  Sitting BP: 136/77 HR: 51  Standing BP: 136/70 HR: 59  Site: upper right arm  Mode: electronic        General Exam:   General Appearance: Appropriately dressed and in no acute distress       Head: Normocephalic, atraumatic and no dysmorphic features  Ear, Nose, and Throat: Moist mucous membranes  CVS: S1S2+  Resp: No SOB, no wheeze or rhonchi  Abd: soft NTND  Extremities: No edema, no cyanosis  Skin: No bruises, no rashes     Neurological Exam:  Mental Status: Awake, alert and oriented x 3.  Able to follow simple and complex verbal commands. Able to name and repeat. fluent speech. No obvious aphasia or dysarthria noted.   Cranial Nerves: PERRL, EOMI, VFFC, sensation V1-V3 intact,  no obvious facial asymmetry , equal elevation of palate, scm/trap 5/5, tongue is midline on protrusion. no obvious papilledema on fundoscopic exam. Hearing is grossly intact.   Motor: Normal bulk, tone and strength throughout. Fine finger movements were intact and symmetric. no tremors or drift noted except LUE with 4+/5 mild drift noted   Sensation: Intact to light touch and pinprick throughout. no right/left confusion. no extinction to tactile on DSS.    Reflexes: 1+ throughout at biceps, brachioradialis, triceps, patellars and ankles bilaterally and equal. No clonus. R toe and L toe were both downgoing.  Coordination: No dysmetria on FNF   Gait: deferred in ICU    I personally reviewed the below data/images/labs:    CBC Full  -  ( 20 Oct 2021 08:23 )  WBC Count : 7.01 K/uL  RBC Count : 3.64 M/uL  Hemoglobin : 10.9 g/dL  Hematocrit : 33.6 %  Platelet Count - Automated : 735 K/uL  Mean Cell Volume : 92.3 fl  Mean Cell Hemoglobin : 29.9 pg  Mean Cell Hemoglobin Concentration : 32.4 gm/dL  Auto Neutrophil # : x  Auto Lymphocyte # : x  Auto Monocyte # : x  Auto Eosinophil # : x  Auto Basophil # : x  Auto Neutrophil % : x  Auto Lymphocyte % : x  Auto Monocyte % : x  Auto Eosinophil % : x  Auto Basophil % : x    10-21    134<L>  |  101  |  38<H>  ----------------------------<  117<H>  4.0   |  17<L>  |  2.81<H>    Ca    9.1      21 Oct 2021 15:00    TPro  7.5  /  Alb  3.8  /  TBili  0.2  /  DBili  <0.1  /  AST  44<H>  /  ALT  17  /  AlkPhos  90  10-21          < from: CT Head No Cont (10.01.21 @ 18:37) >    EXAM:  CT BRAIN                            PROCEDURE DATE:  10/01/2021          INTERPRETATION:  CT OF THE HEAD WITHOUT CONTRAST    CLINICAL INDICATION: Weakness. Falls.    TECHNIQUE: Volumetric CT acquisition was performed through the brain and reviewed using brain and bone window technique. Dose optimization techniques were utilized including kVp/mA modulation along with iterative reconstructions.      COMPARISON: No prior studies have been submitted for comparison.    FINDINGS:    The ventricular and sulcal size and configuration is age appropriate.   There is no acute loss of gray-white differentiation. There are extensive patchy and confluent areas of hypodensity in the periventricular and subcortical white matter which are non-specific but likely related  chronic microangiopathic ischemic changes.  Small chronic infarction in the right cerebellar hemisphere. Chronic appearing left thalamic lacunar infarct.    There is no evidence of mass effect, midline shift, acute intracranial hemorrhage, or extra-axial collections.     The calvarium is intact. The paranasal sinuses are clear.The mastoid air cells are predominantly clear. The orbits are unremarkable.      IMPRESSION:  No acute intracranial hemorrhage or acute territorial infarction. Extensive chronic microvascular ischemic changes and several chronic infarctions as described. Further evaluation may be obtained with MRI of the brain if no contraindications.    --- End of Report ---            KAMILA SZYMANSKI MD; Attending Radiologist  This document has been electronically signed. Oct  1 2021  7:25PM    < end of copied text >  < from: MR Head No Cont (10.04.21 @ 18:09) >    EXAM:  MR BRAIN                            PROCEDURE DATE:  10/04/2021          INTERPRETATION:  .    CLINICAL INFORMATION: Frequent falls.    TECHNIQUE: Multiplanar multisequential MRI of the brain was acquired without the administration of IV gadolinium.    COMPARISON: Prior CT examination of the head dated 10/1/2021.    FINDINGS: Multiple areas of restricted diffusion are seen within the right MCA territory affecting the right frontal, parietal, and temporal lobes. Associated T2/FLAIR prolongation is seen, compatible with cytotoxic edema. No hemorrhagic transformation is noted.    Chronic lacunar infarcts are again seen within the right cerebellar hemisphere, left thalamus, and bilateral basal ganglia.    Multiple patchy confluent nonspecific T2/FLAIR hyperintense signal changes are noted throughout the bihemispheric white matter without associated mass effect or restricted diffusion.    Mild ventriculomegaly appears unchanged. No abnormal intra-axial fluid collections are notable. Flow-voids are noted throughout the major intracranial vessels, on the T2 weighted images, consistent with their patency. The sellar area appears unremarkable. The paranasal sinuses and mastoid air cells are grossly clear. Calvarial signal appears unremarkable. The orbits appear within normal limits.    IMPRESSION: Acute/subacute right-sided MCA distribution infarct with associated cytotoxic edema and without hemorrhagic transformation.    Multiple additional chronic lacunar infarcts and similar-appearing extensive chronic white matter microvascular type changes, as discussed.      --- End of Report ---            LUIS CARLOS BONNER MD; Attending Radiologist  This document has been electronically signed. Oct  5 2021  3:01PM    < end of copied text >  < from: MR Angio Head No Cont (10.06.21 @ 18:15) >    EXAM:  MR ANGIO BRAIN                            PROCEDURE DATE:  10/06/2021          INTERPRETATION:  INDICATION:  Right MCA infarct.    TECHNIQUE:  MR angiography of the brain was performed using three dimensional time-of-flight (3D-TOF) technique.  Imaging was performed on a 1.5 Lisa magnet.    FINDINGS:    INTERNAL CAROTID ARTERIES:  Bilaterally patent.    [place] [facility]:  A right-sided P-comm aneurysm is identified. The aneurysm is directed posteriorly and laterally, the aneurysm appears to be septated and/or bilobed., and measures 8.9 x 7.3 mm.    CEREBRAL ARTERIES:  Both anterior cerebral arteries are patent. Both A1 segments are well formed. Both middle cerebral arteries are patent. There is mild narrowing of the proximal right M1 segment.    VERTEBROBASILAR SYSTEM:  The vertebrobasilar system is patent. There are large posterior communicating arteries bilaterally with dominant supply of the posterior cerebral arteries. Left P1 segment is hypoplastic.    IMPRESSION:    1. 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  2. Mild narrowing of the proximal right middle cerebral artery in the M1 region.  3. The vessels are otherwise patent, as outlined above.    --- End of Report ---            MONSE CLEMENT MD; Attending Radiologist  This document has been electronically signed. Oct  6 2021  7:11PM    < end of copied text >  < from: CT Head No Cont (10.13.21 @ 09:28) >    EXAM:  CT BRAIN                            PROCEDURE DATE:  10/13/2021            INTERPRETATION:  Noncontrast CT of the brain.    CLINICAL INDICATION: Status post rt pcomm aneurysm coiling    TECHNIQUE : Axial CT scanning of the brain was obtainedfrom the skull base to the vertex without the administration of intravenous contrast. Sagittal and coronal reformats were provided.    COMPARISON: CT brain 10/1/2021. MRI brain 10/4/2021    FINDINGS:    Interval placement of embolic coil mass right parasellar region.    Similar mild ventricular dilatation.    No midline shift. Basal cisterns poorly evaluated due to streak artifact.    No acute intracranial hemorrhage, mass effect, or brain edema. Extensive white matter microvascular ischemic disease.    The visualized paranasal sinuses and mastoid air cells are clear.    IMPRESSION:    Interval placement of embolic coil mass right parasellar region.  No acute intracranial hemorrhage, mass effect, or brain edema.  Similar mild ventricular dilatation.        --- End of Report ---      AFIA DAILY MD; Attending Radiologist  This document has been electronically signed. Oct 13 2021  9:31AM    < end of copied text >      < from: MR Head No Cont (10.15.21 @ 16:40) >    EXAM:  MR BRAIN                            PROCEDURE DATE:  10/15/2021            INTERPRETATION:  MRI OF THE BRAIN WITHOUT CONTRAST    CLINICAL INDICATION: Status post right posterior communicating artery aneurysm status post balloon assisted coilembolization    TECHNIQUE: Multiplanar multisequence noncontrast MRI of the brain was performed.    COMPARISON: CT brain, 10/13/2021 and MRI brain, 10/4/2021.    FINDINGS:    The ventricular and sulcal size and configuration is age appropriate. Thereare scattered T2/FLAIR hyperintensities in the subcortical and periventricular white matter which are nonspecific but most commonly represent chronic microvascular ischemic changes.    The previously seen acute lacunar infarctions along left MCA distribution are decreased in diffusion restriction intensity, consistent with evolution of acute infarctions to late subacute stage. In addition, new punctate acute lacunar infarctions have developed in the posterior wall of the right temporal lobe and in the left cerebellar hemisphere along right parietal distribution. There is no susceptibility signal to suggest hemorrhagic conversion.  There is a chronic infarction in the right cerebellum and in the left thalamus.    There is susceptibility signalin the right paraclinoid region at the site of the previously coiled right P-comm aneurysm.    There is abnormal extra-axial fluid collection or hydrocephalus. The visualized globes are symmetric bilaterally.    The visualized paranasal sinuses and mastoid bones are adequately developed and aerated.    IMPRESSION:    Comparison previous studies,    There has been interval evolution of previously seen infarcts in right MCA distribution as described. There is development of new acute lacunar infarctions at the posterior pole of the right temporal lobe and in the right cerebellum as described.    There is no intracranial hemorrhage, midline shift or mass effect.    Coil mass in the right paraclinoid region.    Notification to clinician of alert:    Provider   Dr. BARGER  was notified about the final results at 10/15/2021 10:23 AM via telephone by neuroradiologist KEITH Tineo. Readback confirmation was obtained.    --- End of Report ---                JULIO TINEO MD; Attending Radiologist  This document has been electronically signed. Oct 15 2021 10:35AM    < end of copied text >  < from: CT Chest No Cont (10.15.21 @ 23:27) >    EXAM:  CT CHEST                            PROCEDURE DATE:  10/15/2021            INTERPRETATION:  CLINICAL INFORMATION: Shortness breath, evaluate for pulmonary infection or pulmonary edema.    COMPARISON: CT chest 9/6/2014. CT abdomen pelvis 10/11/2021.    CONTRAST/COMPLICATIONS:  IV Contrast: None  Oral Contrast: None  Complications: None    PROCEDURE:  CT of the Chest was performed.  Sagittal and coronal reformats were performed.    FINDINGS:    LUNGS AND AIRWAYS: Emphysema. No pulmonary nodules or parenchymal consolidations.  PLEURA: Small bilateral pleural effusions.    MEDIASTINUM AND RED: 1.2 cm right paratracheal lymph node (2:40), nonspecific. No axillary lymphadenopathy    VESSELS: Calcified atherosclerotic plaques in aorta and coronary arteries.    HEART: Multifocal cardiac enlargement is noted. No pericardial effusion. Mitral annular calcifications.    CHEST WALL AND LOWER NECK: Within normal limits.    VISUALIZED UPPER ABDOMEN: Partially imaged large hypodense hepatic lesion measuring 6.9 x 8.8 cm, similar in appearance compared to prior CT abdomen pelvis 10/11/2021. Simple cyst in the left kidney    BONES: Degenerative changes of the spine.    IMPRESSION:    Small bilateral pleural effusions with minimal bilateral groundglass opacities and a few areas of interlobular septal thickening. Findings are suggestive of mild interstitial edema. This is superimposed on a background of emphysema.    Redemonstration of large hypodense hepatic lesion, similar appearance whencompared to prior abdominal pelvis 10/11/2021. As mentioned on prior report, consider contrast enhanced MRI for further evaluation.        --- End of Report ---              ALIYA BERTRAND MD; Resident Radiology  This document has been electronicallysigned.  ABRIL PINEDA MD; Attending Radiologist  This document has been electronically signed. Oct 16 2021  9:09AM    < end of copied text >   2 weeks

## 2022-10-11 NOTE — ED ADULT NURSE NOTE - CHIEF COMPLAINT
The patient is a 81y Female complaining of see chief complaint quote. Complex Repair And Xenograft Text: The defect edges were debeveled with a #15 scalpel blade.  The primary defect was closed partially with a complex linear closure.  Given the location of the defect, shape of the defect and the proximity to free margins a xenograft was deemed most appropriate to repair the remaining defect.  The graft was trimmed to fit the size of the remaining defect.  The graft was then placed in the primary defect, oriented appropriately, and sutured into place.

## 2025-05-03 NOTE — ED ADULT NURSE NOTE - NSIMPLEMENTINTERV_GEN_ALL_ED
Anesthesia Post Evaluation    Patient: Angelina Man    Procedure(s) Performed: Procedure(s) (LRB):  INSERTION, INTRAMEDULLARY BERNADINE, FEMUR - right selina (Right)    Final Anesthesia Type: general      Patient location during evaluation: PACU  Patient participation: Yes- Able to Participate  Level of consciousness: awake  Post-procedure vital signs: reviewed and stable  Pain management: adequate  Airway patency: patent    PONV status at discharge: No PONV  Anesthetic complications: no      Cardiovascular status: blood pressure returned to baseline  Respiratory status: spontaneous ventilation and unassisted  Hydration status: euvolemic  Follow-up not needed.              Vitals Value Taken Time   /63 05/03/25 11:27   Temp 36.4 °C (97.6 °F) 05/03/25 11:27   Pulse 105 05/03/25 11:27   Resp 18 05/03/25 11:27   SpO2 96 % 05/03/25 10:30         Event Time   Out of Recovery 05/03/2025 10:00:00         Pain/Aliya Score: Pain Rating Prior to Med Admin: 8 (5/3/2025 10:51 AM)  Pain Rating Post Med Admin: 0 (5/2/2025 10:23 PM)  Aliya Score: 9 (5/3/2025 10:30 AM)           Implemented All Fall with Harm Risk Interventions:  [place] to call system. Call bell, personal items and telephone within reach. Instruct patient to call for assistance. Room bathroom lighting operational. Non-slip footwear when patient is off stretcher. Physically safe environment: no spills, clutter or unnecessary equipment. Stretcher in lowest position, wheels locked, appropriate side rails in place. Provide visual cue, wrist band, yellow gown, etc. Monitor gait and stability. Monitor for mental status changes and reorient to person, place, and time. Review medications for side effects contributing to fall risk. Reinforce activity limits and safety measures with patient and family. Provide visual clues: red socks.